# Patient Record
Sex: FEMALE | Race: WHITE | NOT HISPANIC OR LATINO | Employment: OTHER | URBAN - METROPOLITAN AREA
[De-identification: names, ages, dates, MRNs, and addresses within clinical notes are randomized per-mention and may not be internally consistent; named-entity substitution may affect disease eponyms.]

---

## 2020-01-16 PROBLEM — H81.391 PERIPHERAL VERTIGO INVOLVING RIGHT EAR: Status: ACTIVE | Noted: 2017-08-03

## 2020-01-16 PROBLEM — D80.1 HYPOGAMMAGLOBULINEMIA: Status: ACTIVE | Noted: 2020-01-16

## 2020-01-16 PROBLEM — M25.50 POLYARTHRALGIA: Status: ACTIVE | Noted: 2017-06-13

## 2020-01-16 PROBLEM — R63.4 WEIGHT LOSS: Status: ACTIVE | Noted: 2017-08-01

## 2020-01-16 PROBLEM — R76.8 HEPATITIS B CORE ANTIBODY POSITIVE: Status: ACTIVE | Noted: 2017-06-28

## 2020-01-16 PROBLEM — F33.0 MILD EPISODE OF RECURRENT MAJOR DEPRESSIVE DISORDER: Status: ACTIVE | Noted: 2017-06-13

## 2020-01-16 PROBLEM — M54.12 CERVICAL RADICULITIS: Status: ACTIVE | Noted: 2017-12-21

## 2020-01-16 PROBLEM — M50.30 DDD (DEGENERATIVE DISC DISEASE), CERVICAL: Status: ACTIVE | Noted: 2017-06-13

## 2020-01-16 PROBLEM — M19.90 OA (OSTEOARTHRITIS): Status: ACTIVE | Noted: 2017-06-28

## 2020-01-16 PROBLEM — R76.8 CENTROMERE ANTIBODY POSITIVE: Status: ACTIVE | Noted: 2017-06-13

## 2020-01-16 PROBLEM — F41.9 ANXIETY: Status: ACTIVE | Noted: 2017-06-13

## 2020-01-16 PROBLEM — H90.5 LEFT-SIDED SENSORINEURAL HEARING LOSS: Status: ACTIVE | Noted: 2017-01-28

## 2020-11-18 ENCOUNTER — TELEPHONE (OUTPATIENT)
Dept: ALLERGY | Facility: CLINIC | Age: 65
End: 2020-11-18

## 2020-11-18 NOTE — TELEPHONE ENCOUNTER
----- Message from Alejandra Degroot sent at 11/18/2020 11:15 AM CST -----  Type:  RX Refill Request    Who Called: patient  Refill or New Rx:refill  RX Name and Strength:Amoxicillin 500mg  How is the patient currently taking it? (ex. 1XDay):2xday  Is this a 30 day or 90 day RX:90day  Preferred Pharmacy with phone number:[facility]  Local or Mail Order:Mail order  Ordering Provider:Dr Clancy  Would the patient rather a call back or a response via [facility]? Call back  Best Call Back [phone]  Additional Information: pt states she change pharmacy due to insurance

## 2020-11-18 NOTE — TELEPHONE ENCOUNTER
Called pt and informed her that  will have to see her before refilling any medications , pt made appt for 12/3.

## 2020-12-03 ENCOUNTER — OFFICE VISIT (OUTPATIENT)
Dept: ALLERGY | Facility: CLINIC | Age: 65
End: 2020-12-03
Payer: COMMERCIAL

## 2020-12-03 VITALS
HEIGHT: 65 IN | TEMPERATURE: 98 F | SYSTOLIC BLOOD PRESSURE: 100 MMHG | WEIGHT: 163.81 LBS | DIASTOLIC BLOOD PRESSURE: 60 MMHG | HEART RATE: 80 BPM | BODY MASS INDEX: 27.29 KG/M2

## 2020-12-03 DIAGNOSIS — J31.0 NON-ALLERGIC RHINITIS: ICD-10-CM

## 2020-12-03 DIAGNOSIS — D80.6 SPECIFIC ANTIBODY DEFICIENCY WITH NORMAL IMMUNOGLOBULIN CONCENTRATION AND NORMAL NUMBER OF B LYMPHOCYTES: ICD-10-CM

## 2020-12-03 DIAGNOSIS — L23.5 ALLERGIC DERMATITIS DUE TO OTHER CHEMICAL PRODUCT: ICD-10-CM

## 2020-12-03 DIAGNOSIS — R43.0 ANOSMIA: ICD-10-CM

## 2020-12-03 DIAGNOSIS — J32.9 RECURRENT SINUSITIS: Primary | ICD-10-CM

## 2020-12-03 DIAGNOSIS — R42 VERTIGO: ICD-10-CM

## 2020-12-03 DIAGNOSIS — J40 BRONCHITIS: ICD-10-CM

## 2020-12-03 PROBLEM — L23.9 ALLERGIC CONTACT DERMATITIS: Status: ACTIVE | Noted: 2020-12-03

## 2020-12-03 PROCEDURE — 99214 PR OFFICE/OUTPT VISIT, EST, LEVL IV, 30-39 MIN: ICD-10-PCS | Mod: S$PBB,,, | Performed by: SPECIALIST

## 2020-12-03 PROCEDURE — 99999 PR PBB SHADOW E&M-EST. PATIENT-LVL III: CPT | Mod: PBBFAC,,, | Performed by: SPECIALIST

## 2020-12-03 PROCEDURE — 99999 PR PBB SHADOW E&M-EST. PATIENT-LVL III: ICD-10-PCS | Mod: PBBFAC,,, | Performed by: SPECIALIST

## 2020-12-03 PROCEDURE — 99214 OFFICE O/P EST MOD 30 MIN: CPT | Mod: S$PBB,,, | Performed by: SPECIALIST

## 2020-12-03 PROCEDURE — 99213 OFFICE O/P EST LOW 20 MIN: CPT | Mod: PBBFAC | Performed by: SPECIALIST

## 2020-12-03 RX ORDER — DEXAMETHASONE 4 MG/1
4 TABLET ORAL EVERY 12 HOURS
Qty: 20 TABLET | Refills: 0 | Status: CANCELLED | OUTPATIENT
Start: 2020-12-03 | End: 2020-12-13

## 2020-12-03 RX ORDER — AMOXICILLIN 500 MG/1
500 CAPSULE ORAL
Qty: 180 CAPSULE | Refills: 2 | Status: SHIPPED | OUTPATIENT
Start: 2020-12-03 | End: 2021-09-16

## 2020-12-03 NOTE — PROGRESS NOTES
Subjective:       Patient ID: Acacia Bryant is a 65 y.o. female.    Chief Complaint:    Has a history of Specific antibody deficiency and recurrent infections and on antibiotic prophylaxis . For follow up and to get established at [facility] , [place]    HPI:  Has a long standing history of recurrent sinusitis and bronchitis, on courses of antibiotics nd winter suppressive antibiotic course.  Doing well on this protocol. By previous prick skin testing, does not have allergies to any of the inhalant allergens tested.  HAD lost sense of smell two decades ago following an infection. Neurology consult was made. History of systemic reaction after red wasp sting.  Has large local reactions after insects bites.  By TRUE Patch test , has ACD to nickel, gold and Tixocortol pyvalate.    No outpatient medications have been marked as taking for the 12/3/20 encounter (Appointment) with Chase Clancy MD.        Cleocin [clindamycin hcl], Gold salts, Hydrocodone-acetaminophen, Hydrocortisone, Morphine sulfate, Nickel, Nickel sutures [surgical stainless steel], Oxycodone-acetaminophen, Phenergan [promethazine], Prednisolone, Sulfamethoxazole-trimethoprim, Tamiflu [oseltamivir], Titanium analogues, and Tixocortol pivalate     Past Medical History:   Diagnosis Date    Atypical mole     Benign essential HTN     BPV (benign positional vertigo)     Bunion     Cataract     Cervical radiculopathy     Chronic maxillary sinusitis     Depression     Fatigue     Hypothyroidism (acquired)     Insomnia     Low back pain     Mixed hyperlipidemia     Outlet dysfunction constipation     PMR (polymyalgia rheumatica)        Family History   Problem Relation Age of Onset    Breast cancer Mother     Lung cancer Mother     Allergies Father     Immunodeficiency Father        Environmental History: Dust Mite Controls: Dust mite controls are already in place.   Is well versed with and has successfully implemented environmental control  measures  Review of Systems   Constitutional: Negative.  Negative for fatigue and fever.   HENT: Positive for congestion and postnasal drip. Negative for ear pain, rhinorrhea, sinus pressure, sinus pain, sneezing and sore throat.    Eyes: Negative.  Negative for redness and itching.   Respiratory: Negative.  Negative for cough, chest tightness, shortness of breath and wheezing.    Cardiovascular: Negative.  Negative for chest pain.   Gastrointestinal: Negative.  Negative for nausea.   Endocrine: Negative.  Negative for cold intolerance.   Genitourinary: Negative.  Negative for frequency.   Musculoskeletal: Negative.  Negative for myalgias.   Skin: Negative for rash.   Allergic/Immunologic: Negative.  Negative for environmental allergies, food allergies and immunocompromised state.   Neurological: Negative.  Negative for dizziness and headaches.   Hematological: Negative.  Negative for adenopathy.   Psychiatric/Behavioral: Negative.  Negative for sleep disturbance.       Objective:     There were no vitals taken for this visit.    Physical Exam  Vitals signs reviewed. Exam conducted with a chaperone present.   Constitutional:       Appearance: Normal appearance. She is normal weight.   HENT:      Head: Normocephalic and atraumatic.      Right Ear: Tympanic membrane normal.      Left Ear: Tympanic membrane normal.      Nose: Congestion present.      Mouth/Throat:      Mouth: Mucous membranes are moist.      Pharynx: Oropharynx is clear.   Eyes:      Extraocular Movements: Extraocular movements intact.      Conjunctiva/sclera: Conjunctivae normal.      Pupils: Pupils are equal, round, and reactive to light.   Neck:      Musculoskeletal: Normal range of motion and neck supple.   Cardiovascular:      Rate and Rhythm: Normal rate and regular rhythm.      Pulses: Normal pulses.      Heart sounds: Normal heart sounds.   Pulmonary:      Effort: Pulmonary effort is normal.      Breath sounds: Normal breath sounds.    Abdominal:      General: Abdomen is flat. Bowel sounds are normal. There is no distension.      Palpations: Abdomen is soft.   Musculoskeletal: Normal range of motion.   Skin:     General: Skin is warm and dry.      Capillary Refill: Capillary refill takes more than 3 seconds.   Neurological:      General: No focal deficit present.      Mental Status: She is alert and oriented to person, place, and time. Mental status is at baseline.   Psychiatric:         Mood and Affect: Mood normal.         Behavior: Behavior normal.         Thought Content: Thought content normal.         Judgment: Judgment normal.           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 In the winter months  Recurrent Sinusitis.  Recurrent Bronchitis.  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  History of anaphylaxis after Red Wasp sting.  Large local reactions after horsefly bites.    Plan:   Amoxicillin 500 mg bid through the winter   Dexamethasone 4 mg bid for 5 days for acute infections.  Avoid Tixocortol pyvalate, Nickel and gold.  Claritin 10 mg qd or bid  Avoids Cleocin.  Equate allergy 10 mg at night .  On 2.5 mg prednisone to treat the degenerative spine.  Had influenza vaccine for [id number].  Uptodate with all adult immunization  Follow up in 6 months.

## 2020-12-04 ENCOUNTER — TELEPHONE (OUTPATIENT)
Dept: ALLERGY | Facility: CLINIC | Age: 65
End: 2020-12-04

## 2020-12-04 NOTE — TELEPHONE ENCOUNTER
----- Message from Elif Whitman sent at 12/4/2020  1:04 PM CST -----  Regarding: refill  .Type:  RX Refill Request    Who Called:  pt (pt of Dr. Clancy )  Refill or New Rx: refill   RX Name and Strength: nasal congestion   How is the patient currently taking it? (ex. 1XDay):   Is this a 30 day or 90 day RX:   Preferred Pharmacy with phone number:     [facility] - [place], [place] - [address] [place] [address]  [address] [place] [address]  [address]  [place] [address]  Phone: [phone] Fax: [phone]         Local or Mail Order:  local   Ordering Provider:   Would the patient rather a call back or a response via [facility]? [facility] Call Back Number: [phone] (home)     Additional Information: Rx was not called in to pharmacy

## 2020-12-04 NOTE — TELEPHONE ENCOUNTER
Pt needs Rx sent to her local pharmacy Seen  today he sent antibiotic only and forgot the steroid , pt states she can't wait until Wednesday , can you please send order, it is written in his plan from her appt yesterday. Dexamethasone 4 mg bid for 5 days for acute infections.

## 2020-12-06 RX ORDER — DEXAMETHASONE 0.5 MG/1
0.5 TABLET ORAL 2 TIMES DAILY WITH MEALS
Qty: 20 TABLET | Refills: 0 | Status: SHIPPED | OUTPATIENT
Start: 2020-12-06 | End: 2020-12-16

## 2021-04-28 ENCOUNTER — PATIENT MESSAGE (OUTPATIENT)
Dept: RESEARCH | Facility: HOSPITAL | Age: 66
End: 2021-04-28

## 2021-06-03 ENCOUNTER — LAB VISIT (OUTPATIENT)
Dept: LAB | Facility: HOSPITAL | Age: 66
End: 2021-06-03
Attending: SPECIALIST
Payer: MEDICARE

## 2021-06-03 ENCOUNTER — OFFICE VISIT (OUTPATIENT)
Dept: ALLERGY | Facility: CLINIC | Age: 66
End: 2021-06-03
Payer: MEDICARE

## 2021-06-03 VITALS
TEMPERATURE: 97 F | SYSTOLIC BLOOD PRESSURE: 117 MMHG | HEART RATE: 61 BPM | HEIGHT: 65 IN | WEIGHT: 164 LBS | BODY MASS INDEX: 27.32 KG/M2 | DIASTOLIC BLOOD PRESSURE: 66 MMHG

## 2021-06-03 DIAGNOSIS — L50.0 ALLERGIC URTICARIA: ICD-10-CM

## 2021-06-03 DIAGNOSIS — J32.9 RECURRENT SINUSITIS: ICD-10-CM

## 2021-06-03 DIAGNOSIS — J40 BRONCHITIS: ICD-10-CM

## 2021-06-03 DIAGNOSIS — R43.0 ANOSMIA: ICD-10-CM

## 2021-06-03 DIAGNOSIS — Z91.038 HISTORY OF SYSTEMIC REACTION TO HYMENOPTERA: ICD-10-CM

## 2021-06-03 DIAGNOSIS — J31.0 NON-ALLERGIC RHINITIS: ICD-10-CM

## 2021-06-03 DIAGNOSIS — D80.6 SPECIFIC ANTIBODY DEFICIENCY WITH NORMAL IG CONCENTRATION AND NORMAL NUMBER OF B CELLS: Primary | ICD-10-CM

## 2021-06-03 DIAGNOSIS — L23.0 ALLERGIC CONTACT DERMATITIS DUE TO METALS: ICD-10-CM

## 2021-06-03 PROCEDURE — 86003 ALLG SPEC IGE CRUDE XTRC EA: CPT | Performed by: SPECIALIST

## 2021-06-03 PROCEDURE — 99999 PR PBB SHADOW E&M-EST. PATIENT-LVL III: ICD-10-PCS | Mod: PBBFAC,,, | Performed by: SPECIALIST

## 2021-06-03 PROCEDURE — 99214 OFFICE O/P EST MOD 30 MIN: CPT | Mod: S$GLB,,, | Performed by: SPECIALIST

## 2021-06-03 PROCEDURE — 99999 PR PBB SHADOW E&M-EST. PATIENT-LVL III: CPT | Mod: PBBFAC,,, | Performed by: SPECIALIST

## 2021-06-03 PROCEDURE — 36415 COLL VENOUS BLD VENIPUNCTURE: CPT | Performed by: SPECIALIST

## 2021-06-03 PROCEDURE — 99214 PR OFFICE/OUTPT VISIT, EST, LEVL IV, 30-39 MIN: ICD-10-PCS | Mod: S$GLB,,, | Performed by: SPECIALIST

## 2021-06-03 PROCEDURE — 86003 ALLG SPEC IGE CRUDE XTRC EA: CPT | Mod: 59 | Performed by: SPECIALIST

## 2021-06-03 RX ORDER — EPINEPHRINE 0.3 MG/.3ML
1 INJECTION SUBCUTANEOUS ONCE
Qty: 2 DEVICE | Refills: 3 | Status: SHIPPED | OUTPATIENT
Start: 2021-06-03 | End: 2022-12-27

## 2021-06-07 LAB
DEPRECATED HONEY BEE IGE RAST QL: NORMAL
DEPRECATED PAPER WASP IGE RAST QL: NORMAL
DEPRECATED WHITEFACED HORNET IGE RAST QL: NORMAL
DEPRECATED YELLOW JACKET IGE RAST QL: NORMAL
HONEY BEE IGE QN: <0.1 KU/L
PAPER WASP IGE QN: <0.1 KU/L
WHITEFACED HORNET IGE QN: <0.1 KU/L
YELLOW JACKET IGE QN: <0.1 KU/L

## 2021-12-30 ENCOUNTER — OFFICE VISIT (OUTPATIENT)
Dept: ALLERGY | Facility: CLINIC | Age: 66
End: 2021-12-30
Payer: MEDICARE

## 2021-12-30 VITALS
WEIGHT: 174.63 LBS | SYSTOLIC BLOOD PRESSURE: 109 MMHG | BODY MASS INDEX: 29.09 KG/M2 | TEMPERATURE: 98 F | DIASTOLIC BLOOD PRESSURE: 67 MMHG | HEIGHT: 65 IN | HEART RATE: 78 BPM

## 2021-12-30 DIAGNOSIS — Z88.9 MULTIPLE DRUG ALLERGIES: ICD-10-CM

## 2021-12-30 DIAGNOSIS — J32.9 RECURRENT SINUSITIS: ICD-10-CM

## 2021-12-30 DIAGNOSIS — R53.81 CHRONIC FATIGUE AND MALAISE: ICD-10-CM

## 2021-12-30 DIAGNOSIS — J40 BRONCHITIS: ICD-10-CM

## 2021-12-30 DIAGNOSIS — D80.6 SPECIFIC ANTIBODY DEFICIENCY WITH NORMAL IG CONCENTRATION AND NORMAL NUMBER OF B CELLS: Primary | ICD-10-CM

## 2021-12-30 DIAGNOSIS — J31.0 NON-ALLERGIC RHINITIS: ICD-10-CM

## 2021-12-30 DIAGNOSIS — R53.82 CHRONIC FATIGUE AND MALAISE: ICD-10-CM

## 2021-12-30 DIAGNOSIS — R43.0 ANOSMIA: ICD-10-CM

## 2021-12-30 PROCEDURE — 99214 PR OFFICE/OUTPT VISIT, EST, LEVL IV, 30-39 MIN: ICD-10-PCS | Mod: S$GLB,,, | Performed by: SPECIALIST

## 2021-12-30 PROCEDURE — 99999 PR PBB SHADOW E&M-EST. PATIENT-LVL IV: CPT | Mod: PBBFAC,,, | Performed by: SPECIALIST

## 2021-12-30 PROCEDURE — 99214 OFFICE O/P EST MOD 30 MIN: CPT | Mod: S$GLB,,, | Performed by: SPECIALIST

## 2021-12-30 PROCEDURE — 99999 PR PBB SHADOW E&M-EST. PATIENT-LVL IV: ICD-10-PCS | Mod: PBBFAC,,, | Performed by: SPECIALIST

## 2021-12-30 RX ORDER — CYCLOBENZAPRINE HCL 5 MG
TABLET ORAL
COMMUNITY
Start: 2021-09-16 | End: 2022-02-14

## 2021-12-30 RX ORDER — ESTRADIOL 2 MG/1
2 TABLET ORAL DAILY
COMMUNITY
Start: 2021-12-28 | End: 2022-02-25 | Stop reason: ALTCHOICE

## 2022-06-29 NOTE — PROGRESS NOTES
Subjective:       Patient ID: Acacia Bryant is a 66 y.o. female.    Chief Complaint:    FOLLOW UP ON SPECIFIC ANTIBODY DEFICIENCY AND RECURRENT INFECTIONS    HPI:        66 year old lady with SAD- NI--- Immunodeficiency with normal immunoglobulins.  On Amoxicillin prophylaxis.-- Had bad bouts of sinusitis and bronchitis in January- February -- treated by Julius Johnson MD with steroid amd multiple antibiotics    Has a history of recurrent sino pulmonary infections, in spite of being fully immunized with PPSV- 23 and PCV- 13.    PER PREVIOUS ALLERGY SKIN TESTING, DOES NOT HAVE ALLERGIES TO COMMON AERO ALLERGENS-- Allergy symtoms are symptomatically treated.  She over a decade ago developed anosmia after a viral infections.  Has allergy to multiple drugs.  Has been chronically fatigued.  HAS Allergic Contact dermatitis to gold, Nickel and Tixocortol pyvalate. Shre has been avoiding contact allergens.    Allergy to --- true allergy--- was ruled out by normal IgE RAST to them.  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    Had back pain-- landed up getting diagnosed with left hip worn. Left hip was replaced on April 06 2022-- by Dr. Mathews. Will follow up with him soon.  Cleocin [clindamycin hcl], Gold salts, Hydrocodone-acetaminophen, Hydrocortisone, Morphine sulfate, Nickel, Nickel sutures [surgical stainless steel], Oxycodone-acetaminophen, Phenergan [promethazine], Prednisolone, Tamiflu [oseltamivir], Titanium analogues, and Tixocortol pivalate ---  Are the reported allergies or contact allergies    Past Medical History:   Diagnosis Date    Allergic urticaria 6/3/2021    Allergy     Atypical mole     Benign essential HTN     BPV (benign positional vertigo)     Bunion     Cataract     Cervical radiculopathy     Chronic maxillary sinusitis     Depression     Fatigue     Hypothyroidism (acquired)     Insomnia     Low back pain     Mixed hyperlipidemia     Outlet dysfunction  constipation     PMR (polymyalgia rheumatica)        Family History   Problem Relation Age of Onset    Breast cancer Mother     Lung cancer Mother     Allergies Father     Immunodeficiency Father        Environmental History: not applicable     Review of Systems   Constitutional: Positive for fatigue. Negative for fever.   HENT: Positive for congestion, postnasal drip and rhinorrhea. Negative for ear pain, sinus pressure, sinus pain, sneezing and sore throat.    Eyes: Positive for itching. Negative for redness.   Respiratory: Negative.  Negative for cough, chest tightness, shortness of breath and wheezing.    Cardiovascular: Negative.  Negative for chest pain.   Gastrointestinal: Negative.  Negative for nausea.   Endocrine: Negative.  Negative for cold intolerance.   Genitourinary: Negative.  Negative for frequency.   Musculoskeletal: Negative.  Negative for myalgias.   Skin: Negative.  Negative for rash.   Allergic/Immunologic: Negative.  Negative for environmental allergies, food allergies and immunocompromised state.   Neurological: Negative.  Negative for dizziness and headaches.   Hematological: Negative.  Negative for adenopathy.   Psychiatric/Behavioral: Negative.  Negative for sleep disturbance.       Objective:     There were no vitals taken for this visit.    Physical Exam  Vitals and nursing note reviewed.   Constitutional:       Appearance: Normal appearance. She is normal weight.   HENT:      Head: Normocephalic and atraumatic.      Right Ear: Tympanic membrane, ear canal and external ear normal.      Left Ear: Tympanic membrane, ear canal and external ear normal.      Nose: Nose normal.      Mouth/Throat:      Mouth: Mucous membranes are moist.      Pharynx: Oropharynx is clear.   Eyes:      Extraocular Movements: Extraocular movements intact.      Conjunctiva/sclera: Conjunctivae normal.      Pupils: Pupils are equal, round, and reactive to light.   Cardiovascular:      Rate and Rhythm: Normal  rate and regular rhythm.      Pulses: Normal pulses.      Heart sounds: Normal heart sounds.   Pulmonary:      Effort: Pulmonary effort is normal.      Breath sounds: Normal breath sounds.   Abdominal:      General: Abdomen is flat. Bowel sounds are normal.      Palpations: Abdomen is soft.   Musculoskeletal:         General: Normal range of motion.      Cervical back: Normal range of motion and neck supple.   Skin:     General: Skin is warm and dry.      Capillary Refill: Capillary refill takes less than 2 seconds.   Neurological:      General: No focal deficit present.      Mental Status: She is alert and oriented to person, place, and time. Mental status is at baseline.   Psychiatric:         Mood and Affect: Mood normal.         Behavior: Behavior normal.         Thought Content: Thought content normal.         Judgment: Judgment normal.           Assessment:      1. Bronchitis    2. Specific antibody deficiency with normal IG concentration and normal number of B cells    3. Recurrent sinusitis    4. Non-allergic rhinitis    5. Malaise and fatigue    6. Multiple drug allergies    7. Anosmia    8. Allergic contact dermatitis due to metals    9       ACD to Nickel, gold, Tixocortol pyvalate by patch tests  10      No true IgE mediated allergy to Hymenoptera-- Had fainting spells in the past    Plan:   Treat all infections.  Amoxicillin 500 mg bid for prophylaxis  Potentially re immunize with PCV- 20.  AZELASTINE 137 mcg or /  PLUS FLONASE 50 mcg-- qd or bid\  Zyrtec 10 mg qd.  During sino bronchitis, treat with nebulized Duoneb-- one unit tid prn    Avoid contact allergens-- Gold, Nickel and Tixocortol pyvalate.  COVID vaccine.  Upto date on adult immunizations.  Annual influenza =vaccinations.  Follow up in 6 months                  Problems Address                                                 Amount and/or Complexity                                                                      Risk       3            [] 2 or more self-limited or minor problems                      [] Limited                                                                        [] Low                  [] 1 stable chronic illness                                                  Any combination of the two                                               OTC drugs                  []Acute, uncomplicated illness or injury                            Review of prior external notes from unique source           Minor surgery with no risk factors                                                                                                               [] 1 []2  []3+                                                                                                              Review of results from each unique test                                                                                                               [] 1 []2  [] 3+                                                                                                              Order of each unique test                                                                                                               [] 1 []2  [] 3+                                                                                                              Or                                                                                                             [] Assessment requiring an independent historian      4            [x] One or more chronic illness with exacerbation,              [x] Moderate                                                                      [x] Moderate                 Progression, or side effects of treatment                            -test documents or independent historians                        Prescription drug management                [x]  2 or more sable chronic illnesses                                    [] Independent interpretation of tests                               Minor surgery with identifiable risk                [] 1 undiagnosed new problem with uncertain prognosis    [] Discussion or management of test results                    elective major surgery                [] 1 acute illness with                systemic symptoms                                                                                                                                                              [] 1 acute complicated injury                                                                                                                                          Elective major surgery                                                                                                                                                                                                                                                                                                                                                                                                  5            [] 1 or more chronic illnesses with severe exacerbation,     [] Extensive(two from below)                                         [] High                                                                                                               [] Independent interpretation of results                         Drug therapy requiring intensive                                                                                                               []Discussion of management or test interpretation           monitoring                                                                                                                                                                                                       Decision to de-escalate care                 [] 1 acute or chronic illness or injury that poses a threat                                                                                               Decision  regarding hospitalization

## 2022-06-30 ENCOUNTER — OFFICE VISIT (OUTPATIENT)
Dept: ALLERGY | Facility: CLINIC | Age: 67
End: 2022-06-30
Payer: MEDICARE

## 2022-06-30 VITALS
HEIGHT: 66 IN | BODY MASS INDEX: 28.27 KG/M2 | TEMPERATURE: 98 F | WEIGHT: 175.94 LBS | DIASTOLIC BLOOD PRESSURE: 69 MMHG | HEART RATE: 73 BPM | SYSTOLIC BLOOD PRESSURE: 115 MMHG

## 2022-06-30 DIAGNOSIS — Z88.9 MULTIPLE DRUG ALLERGIES: ICD-10-CM

## 2022-06-30 DIAGNOSIS — J31.0 NON-ALLERGIC RHINITIS: ICD-10-CM

## 2022-06-30 DIAGNOSIS — L23.0 ALLERGIC CONTACT DERMATITIS DUE TO METALS: ICD-10-CM

## 2022-06-30 DIAGNOSIS — R43.0 ANOSMIA: ICD-10-CM

## 2022-06-30 DIAGNOSIS — D80.6 SPECIFIC ANTIBODY DEFICIENCY WITH NORMAL IG CONCENTRATION AND NORMAL NUMBER OF B CELLS: ICD-10-CM

## 2022-06-30 DIAGNOSIS — J32.9 RECURRENT SINUSITIS: ICD-10-CM

## 2022-06-30 DIAGNOSIS — R53.83 MALAISE AND FATIGUE: ICD-10-CM

## 2022-06-30 DIAGNOSIS — J40 BRONCHITIS: Primary | ICD-10-CM

## 2022-06-30 DIAGNOSIS — R53.81 MALAISE AND FATIGUE: ICD-10-CM

## 2022-06-30 PROCEDURE — 99999 PR PBB SHADOW E&M-EST. PATIENT-LVL IV: CPT | Mod: PBBFAC,,, | Performed by: SPECIALIST

## 2022-06-30 PROCEDURE — 99214 OFFICE O/P EST MOD 30 MIN: CPT | Mod: S$GLB,,, | Performed by: SPECIALIST

## 2022-06-30 PROCEDURE — 99999 PR PBB SHADOW E&M-EST. PATIENT-LVL IV: ICD-10-PCS | Mod: PBBFAC,,, | Performed by: SPECIALIST

## 2022-06-30 PROCEDURE — 99214 PR OFFICE/OUTPT VISIT, EST, LEVL IV, 30-39 MIN: ICD-10-PCS | Mod: S$GLB,,, | Performed by: SPECIALIST

## 2022-06-30 PROCEDURE — 99214 OFFICE O/P EST MOD 30 MIN: CPT | Mod: PBBFAC | Performed by: SPECIALIST

## 2022-06-30 RX ORDER — IPRATROPIUM BROMIDE AND ALBUTEROL SULFATE 2.5; .5 MG/3ML; MG/3ML
3 SOLUTION RESPIRATORY (INHALATION) EVERY 6 HOURS PRN
Qty: 270 ML | Refills: 3 | Status: SHIPPED | OUTPATIENT
Start: 2022-06-30 | End: 2022-09-21 | Stop reason: SDUPTHER

## 2022-12-29 ENCOUNTER — OFFICE VISIT (OUTPATIENT)
Dept: ALLERGY | Facility: CLINIC | Age: 67
End: 2022-12-29
Payer: MEDICARE

## 2022-12-29 VITALS
HEART RATE: 82 BPM | HEIGHT: 65 IN | SYSTOLIC BLOOD PRESSURE: 137 MMHG | BODY MASS INDEX: 28.79 KG/M2 | DIASTOLIC BLOOD PRESSURE: 72 MMHG | TEMPERATURE: 98 F | WEIGHT: 172.81 LBS

## 2022-12-29 DIAGNOSIS — Z88.9 MULTIPLE DRUG ALLERGIES: ICD-10-CM

## 2022-12-29 DIAGNOSIS — L23.0 ALLERGIC CONTACT DERMATITIS DUE TO METALS: ICD-10-CM

## 2022-12-29 DIAGNOSIS — R53.83 MALAISE AND FATIGUE: ICD-10-CM

## 2022-12-29 DIAGNOSIS — D80.6 SPECIFIC ANTIBODY DEFICIENCY WITH NORMAL IG CONCENTRATION AND NORMAL NUMBER OF B CELLS: Primary | ICD-10-CM

## 2022-12-29 DIAGNOSIS — R53.81 MALAISE AND FATIGUE: ICD-10-CM

## 2022-12-29 DIAGNOSIS — R43.0 ANOSMIA: ICD-10-CM

## 2022-12-29 DIAGNOSIS — J40 BRONCHITIS: ICD-10-CM

## 2022-12-29 DIAGNOSIS — J32.9 RECURRENT SINUSITIS: ICD-10-CM

## 2022-12-29 PROCEDURE — 3288F PR FALLS RISK ASSESSMENT DOCUMENTED: ICD-10-PCS | Mod: CPTII,S$GLB,, | Performed by: SPECIALIST

## 2022-12-29 PROCEDURE — 3288F FALL RISK ASSESSMENT DOCD: CPT | Mod: CPTII,S$GLB,, | Performed by: SPECIALIST

## 2022-12-29 PROCEDURE — 3075F PR MOST RECENT SYSTOLIC BLOOD PRESS GE 130-139MM HG: ICD-10-PCS | Mod: CPTII,S$GLB,, | Performed by: SPECIALIST

## 2022-12-29 PROCEDURE — 1126F PR PAIN SEVERITY QUANTIFIED, NO PAIN PRESENT: ICD-10-PCS | Mod: CPTII,S$GLB,, | Performed by: SPECIALIST

## 2022-12-29 PROCEDURE — 4010F PR ACE/ARB THEARPY RXD/TAKEN: ICD-10-PCS | Mod: CPTII,S$GLB,, | Performed by: SPECIALIST

## 2022-12-29 PROCEDURE — 99214 OFFICE O/P EST MOD 30 MIN: CPT | Mod: S$GLB,,, | Performed by: SPECIALIST

## 2022-12-29 PROCEDURE — 3008F BODY MASS INDEX DOCD: CPT | Mod: CPTII,S$GLB,, | Performed by: SPECIALIST

## 2022-12-29 PROCEDURE — 4010F ACE/ARB THERAPY RXD/TAKEN: CPT | Mod: CPTII,S$GLB,, | Performed by: SPECIALIST

## 2022-12-29 PROCEDURE — 99999 PR PBB SHADOW E&M-EST. PATIENT-LVL IV: CPT | Mod: PBBFAC,,, | Performed by: SPECIALIST

## 2022-12-29 PROCEDURE — 3078F DIAST BP <80 MM HG: CPT | Mod: CPTII,S$GLB,, | Performed by: SPECIALIST

## 2022-12-29 PROCEDURE — 1160F RVW MEDS BY RX/DR IN RCRD: CPT | Mod: CPTII,S$GLB,, | Performed by: SPECIALIST

## 2022-12-29 PROCEDURE — 3075F SYST BP GE 130 - 139MM HG: CPT | Mod: CPTII,S$GLB,, | Performed by: SPECIALIST

## 2022-12-29 PROCEDURE — 99214 PR OFFICE/OUTPT VISIT, EST, LEVL IV, 30-39 MIN: ICD-10-PCS | Mod: S$GLB,,, | Performed by: SPECIALIST

## 2022-12-29 PROCEDURE — 1101F PT FALLS ASSESS-DOCD LE1/YR: CPT | Mod: CPTII,S$GLB,, | Performed by: SPECIALIST

## 2022-12-29 PROCEDURE — 1159F PR MEDICATION LIST DOCUMENTED IN MEDICAL RECORD: ICD-10-PCS | Mod: CPTII,S$GLB,, | Performed by: SPECIALIST

## 2022-12-29 PROCEDURE — 1126F AMNT PAIN NOTED NONE PRSNT: CPT | Mod: CPTII,S$GLB,, | Performed by: SPECIALIST

## 2022-12-29 PROCEDURE — 99999 PR PBB SHADOW E&M-EST. PATIENT-LVL IV: ICD-10-PCS | Mod: PBBFAC,,, | Performed by: SPECIALIST

## 2022-12-29 PROCEDURE — 1159F MED LIST DOCD IN RCRD: CPT | Mod: CPTII,S$GLB,, | Performed by: SPECIALIST

## 2022-12-29 PROCEDURE — 3008F PR BODY MASS INDEX (BMI) DOCUMENTED: ICD-10-PCS | Mod: CPTII,S$GLB,, | Performed by: SPECIALIST

## 2022-12-29 PROCEDURE — 1160F PR REVIEW ALL MEDS BY PRESCRIBER/CLIN PHARMACIST DOCUMENTED: ICD-10-PCS | Mod: CPTII,S$GLB,, | Performed by: SPECIALIST

## 2022-12-29 PROCEDURE — 3078F PR MOST RECENT DIASTOLIC BLOOD PRESSURE < 80 MM HG: ICD-10-PCS | Mod: CPTII,S$GLB,, | Performed by: SPECIALIST

## 2022-12-29 PROCEDURE — 1101F PR PT FALLS ASSESS DOC 0-1 FALLS W/OUT INJ PAST YR: ICD-10-PCS | Mod: CPTII,S$GLB,, | Performed by: SPECIALIST

## 2022-12-29 RX ORDER — BUDESONIDE 0.5 MG/2ML
0.5 INHALANT ORAL DAILY
Qty: 60 ML | Refills: 1 | Status: SHIPPED | OUTPATIENT
Start: 2022-12-29 | End: 2023-05-12

## 2022-12-29 NOTE — PROGRESS NOTES
Subjective:       Patient ID: Acacia Bryant is a 67 y.o. female.    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    During and after immune work up, she was immunized with multiple PPSV- 23 and PCV- 13 vaccines and pre and post titers determined.  She was not started on IVIG.  But she is helped by antibiotic prophylaxis.  Previous allergy skin testing with inhalant aero allergens was normal.    OVER A DECADE AND A HALF AGO SHE DEVELOPED ANOSMIA AFTER A VIRAL INFECTION--- ENT and Neurology consults and therapy did not help.    Has Allergic Contact Dermatitis to Nickel, Gold and Tixocortol pyvalate by Patch Testing.    Had fainting spells in the past after Hymenoptera stings-- Ig E RAST to various hymenoptera were normal -- negative  Has chronic malaise and fatigue.  Retired teacher at [employer]. Never smoked cigarettes. No ongoing allergens or irritants exposure       Cleocin [clindamycin hcl], Gold salts, Hydrocodone-acetaminophen, Hydrocortisone, Morphine sulfate, Nickel, Nickel sutures [surgical stainless steel], Oxycodone-acetaminophen, Phenergan [promethazine], Prednisolone, Tamiflu [oseltamivir], Titanium analogues, and Tixocortol pivalate -----  ARE THE ALLERGIES OR CONTACT ALLERGIES REPORTED    Past Medical History:   Diagnosis Date    Allergic urticaria 6/3/2021    Allergy     Atypical mole     Benign essential HTN     BPV (benign positional  vertigo)     Bunion     Cataract     Cervical radiculopathy     Chronic maxillary sinusitis     Depression     Fatigue     Hypothyroidism (acquired)     Insomnia     Low back pain     Mixed hyperlipidemia     Outlet dysfunction constipation     PMR (polymyalgia rheumatica)        Family History   Problem Relation Age of Onset    Breast cancer Mother     Lung cancer Mother     Allergies Father     Immunodeficiency Father        Environmental History: Dust Mite Controls: Dust mite controls are not in place.     Review of Systems   Constitutional:  Positive for fatigue. Negative for fever.   HENT:  Positive for congestion, postnasal drip and rhinorrhea. Negative for ear pain, sinus pressure, sinus pain, sneezing and sore throat.    Eyes:  Positive for itching. Negative for redness.   Respiratory:  Positive for cough. Negative for chest tightness, shortness of breath and wheezing.    Cardiovascular: Negative.  Negative for chest pain.   Gastrointestinal: Negative.  Negative for nausea.   Endocrine: Negative.  Negative for cold intolerance.   Genitourinary: Negative.  Negative for frequency.   Musculoskeletal: Negative.  Negative for myalgias.   Skin: Negative.  Negative for rash.   Allergic/Immunologic: Negative.  Negative for environmental allergies, food allergies and immunocompromised state.   Neurological: Negative.  Negative for dizziness and headaches.   Hematological: Negative.  Negative for adenopathy.   Psychiatric/Behavioral: Negative.  Negative for sleep disturbance.      Objective:     There were no vitals taken for this visit.    Physical Exam  Vitals and nursing note reviewed.   Constitutional:       Appearance: Normal appearance. She is normal weight.   HENT:      Head: Normocephalic and atraumatic.      Right Ear: Tympanic membrane, ear canal and external ear normal.      Left Ear: Tympanic membrane, ear canal and external ear normal.      Nose: Congestion and rhinorrhea present.      Mouth/Throat:       Mouth: Mucous membranes are moist.      Pharynx: Oropharynx is clear.   Eyes:      Extraocular Movements: Extraocular movements intact.      Conjunctiva/sclera: Conjunctivae normal.      Pupils: Pupils are equal, round, and reactive to light.   Cardiovascular:      Rate and Rhythm: Normal rate and regular rhythm.      Pulses: Normal pulses.      Heart sounds: Normal heart sounds.   Pulmonary:      Effort: Pulmonary effort is normal.      Breath sounds: Normal breath sounds.   Abdominal:      General: Abdomen is flat. Bowel sounds are normal.      Palpations: Abdomen is soft.   Musculoskeletal:         General: Normal range of motion.      Cervical back: Normal range of motion and neck supple.   Skin:     General: Skin is warm and dry.      Capillary Refill: Capillary refill takes less than 2 seconds.   Neurological:      General: No focal deficit present.      Mental Status: She is alert and oriented to person, place, and time. Mental status is at baseline.   Psychiatric:         Mood and Affect: Mood normal.         Behavior: Behavior normal.         Thought Content: Thought content normal.         Judgment: Judgment normal.       Assessment:      1. 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 Recurrent sinusitis    3. Bronchitis    4. Anosmia    5. Malaise and fatigue    6. Multiple drug allergies    7. Allergic contact dermatitis due to metals        Plan:     TREAT ALL INFECTIONS.  AMOXICILLIN 500 mg bid PROPHYLAXIS.  MAY IMMUNIZE WITH PCV- 20-- the patient does not want the vaccine due to previous large local reactions  Annual influenza vaccinations  COVID vaccine-- HAD 3  DOSES--- LAST ONE 08- 25- 2021  To treat bronchitis, prn  nebulized Duoneb bid plus budesonide 0.50 mg bid--- prn-- sent to  [facility]- [place], [place]  Avoid all the Contact allergens- Nickel, Gold , titanium, Tixocortol pyvalate  Upto date on adult immunizations.  Follow up in 6  months                    Problems Address                                                 Amount and/or Complexity                                                                      Risk       3           [] 2 or more self-limited or minor problems                      [] Limited                                                                        [] Low                  [] 1 stable chronic illness                                                  Any combination of the two                                               OTC drugs                  []Acute, uncomplicated illness or injury                            Review of prior external notes from unique source           Minor surgery with no risk factors                                                                                                               [] 1 []2  []3+                                                                                                              Review of results from each unique test                                                                                                               [] 1 []2  [] 3+                                                                                                              Order of each unique test                                                                                                               [] 1 []2  [] 3+                                                                                                              Or                                                                                                             [] Assessment requiring an independent historian      4            [x] One or more chronic illness with exacerbation,              [x] Moderate                                                                       Moderate                 Progression, or side effects of treatment                            -test documents or independent historians                         P[]rescription drug management                [x]  2 or more sable chronic illnesses                                    [] Independent interpretation of tests                              Minor surgery with identifiable risk                [] 1 undiagnosed new problem with uncertain prognosis    [] Discussion or management of test results                    elective major surgery                [] 1 acute illness with                systemic symptoms                                                                                                                                                              [] 1 acute complicated injury                                                                                                                                          Elective major surgery                                                                                                                                                                                                                                                                                                                                                                                                  5            [] 1 or more chronic illnesses with severe exacerbation,     [] Extensive(two from below)                                         [] High                                                                                                               [] Independent interpretation of results                         Drug therapy requiring intensive                                                                                                               []Discussion of management or test interpretation           monitoring                                                                                                                                                                                                        Decision to de-escalate care                 [] 1 acute or chronic illness or injury that poses a threat                                                                                               Decision regarding hospitalization

## 2023-03-23 ENCOUNTER — TELEPHONE (OUTPATIENT)
Dept: ALLERGY | Facility: CLINIC | Age: 68
End: 2023-03-23
Payer: MEDICARE

## 2023-03-23 NOTE — TELEPHONE ENCOUNTER
----- Message from Rosoi Morales MA sent at 3/23/2023  4:09 PM CDT -----  Contact: Acacia    ----- Message -----  From: Troy Fajardo  Sent: 3/23/2023   4:02 PM CDT  To: [facility]     Patient stated that she has been diagnosed with Superior Semi circular canal dishefcence, wanted to know if there is a particular doctor pcp can refer her to.  Any suggestions.  Please call her back at [phone]

## 2023-03-23 NOTE — TELEPHONE ENCOUNTER
Spoke with pt. I would recommend Dr Remington Larry. Pt has actually already seen his nurse practitioner and started testing but wanted to make sure I agreed with whom her PCP chose.

## 2023-04-06 ENCOUNTER — PATIENT MESSAGE (OUTPATIENT)
Dept: RESEARCH | Facility: HOSPITAL | Age: 68
End: 2023-04-06
Payer: MEDICARE

## 2023-05-31 ENCOUNTER — PATIENT MESSAGE (OUTPATIENT)
Dept: RESEARCH | Facility: HOSPITAL | Age: 68
End: 2023-05-31
Payer: MEDICARE

## 2023-06-28 NOTE — PROGRESS NOTES
Subjective:       Patient ID: Acacia Bryant is a 67 y.o. female.    Chief Complaint:    Follow up on Specific Antibody Deficiency with normal immunoglobulins -- SAD- NI ---- and recurrent infections and ANOSMIA.  HPI:  Has bilateral severe otalgiA--- right more than right-- Dr Lawson , Neuro surgeon and Dr Alfonso Larry MD, Neuro- otologist will do   right ear surgery on 10- 03- 2023. female 67 year old has SAD- NI, as revealed by immune evaluation in the past. She is on antibiotic prophylaxis to prevent recurrent sinus and bronchial infections. She had had multiple pneumococcal vaccines. She will not mount antibodies adequately or will lose the protection after several months.   Breakthrough infections are promptly treated.Also nebulized Duoneb and Pulmicort helps to relieve the bronchitis.  Following an infection, she developed anosmia about 15 years ago. Neurology and ENT consults were done.    Has ACD to Tixocortol Pyvalate, Nickel, Gold and platinum.    She had had fainting spells with Hymenoptera stins-- but had class 0 IgE RAST to hymenoptera.  Had been chronically fatigued. Retired from [employer].  Has multiple drug allergies.    Cleocin [clindamycin hcl], Gold salts, Hydrocodone-acetaminophen, Hydrocortisone, Morphine sulfate, Nickel, Nickel sutures [surgical stainless steel], Oxycodone-acetaminophen, Phenergan [promethazine], Prednisolone, Tamiflu [oseltamivir], Titanium analogues, and Tixocortol pivalate -- allergies were reported    Past Medical History:   Diagnosis Date    Allergic urticaria 6/3/2021    Allergy     Atypical mole     Benign essential HTN     BPV (benign positional vertigo)     Bunion     Cataract     Cervical radiculopathy     Chronic maxillary sinusitis     Depression     Fatigue     Hypothyroidism (acquired)     Insomnia     Low back pain     Mixed hyperlipidemia     Outlet dysfunction constipation     PMR (polymyalgia rheumatica)        Family History    Problem Relation Age of Onset    Breast cancer Mother     Lung cancer Mother     Allergies Father     Immunodeficiency Father        Environmental History: Dust Mite Controls: Dust mite controls are already in place.     Review of Systems   Constitutional:  Positive for fatigue. Negative for fever.   HENT:  Positive for congestion, ear pain and postnasal drip. Negative for rhinorrhea, sinus pressure, sinus pain, sneezing and sore throat.    Eyes: Negative.  Negative for redness and itching.   Respiratory: Negative.  Negative for cough, chest tightness, shortness of breath and wheezing.    Cardiovascular: Negative.  Negative for chest pain.   Gastrointestinal: Negative.  Negative for nausea.   Endocrine: Negative.  Negative for cold intolerance.   Genitourinary: Negative.  Negative for frequency.   Musculoskeletal: Negative.  Negative for myalgias.   Skin: Negative.  Negative for rash.   Allergic/Immunologic: Negative.  Negative for environmental allergies, food allergies and immunocompromised state.   Neurological: Negative.  Negative for dizziness and headaches.   Hematological: Negative.  Negative for adenopathy.   Psychiatric/Behavioral: Negative.  Negative for sleep disturbance.      Objective:     There were no vitals taken for this visit.    Physical Exam  Vitals and nursing note reviewed.   Constitutional:       Appearance: Normal appearance. She is normal weight.   HENT:      Head: Normocephalic and atraumatic.      Right Ear: Tympanic membrane, ear canal and external ear normal.      Left Ear: Tympanic membrane, ear canal and external ear normal.      Nose: Congestion present.      Mouth/Throat:      Mouth: Mucous membranes are moist.      Pharynx: Oropharynx is clear.   Eyes:      Extraocular Movements: Extraocular movements intact.      Conjunctiva/sclera: Conjunctivae normal.      Pupils: Pupils are equal, round, and reactive to light.   Cardiovascular:      Rate and Rhythm: Normal rate and regular  rhythm.      Pulses: Normal pulses.      Heart sounds: Normal heart sounds.   Pulmonary:      Effort: Pulmonary effort is normal.      Breath sounds: Normal breath sounds.   Abdominal:      General: Abdomen is flat. Bowel sounds are normal.      Palpations: Abdomen is soft.   Musculoskeletal:         General: Normal range of motion.      Cervical back: Normal range of motion and neck supple.   Skin:     General: Skin is warm and dry.      Capillary Refill: Capillary refill takes less than 2 seconds.   Neurological:      General: No focal deficit present.      Mental Status: She is alert and oriented to person, place, and time. Mental status is at baseline.   Psychiatric:         Mood and Affect: Mood normal.         Behavior: Behavior normal.         Thought Content: Thought content normal.         Judgment: Judgment normal.       Assessment:      1. 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 Recurrent sinusitis    3. Bronchitis    4. Malaise and fatigue    5. Allergic contact dermatitis due to metals    6. Multiple drug allergies    7       Anosmia of several years    Plan:     Treat all infections  ON Amoxicillin 5400 mg bid prophylaxis.  Avoid  Nickel, Gold, Titanium and Tixocortol pyvalate, that caused contact dermatitis in the past or was positive by Patch Testing.  Treat all infections  Would benefit from PCV- 20- The patient is reluctant since Pneumovax caused large local reactions in the past.  Had 3rd dose of COVID vaccine on August , 25, 2021.'  Nebulized Duoneb plus Budesonide 0.50 mg bid for bronchitis.  Avoid TIXOCORTOL PYVALATE-- May take Decadron-- and may apply topical desoxymetasone ointment as needed  Follow up in January 2024.                Problems Address                                                 Amount and/or Complexity                                                                      Risk       3           [] 2 or more self-limited or  minor problems                      [] Limited                                                                        [] Low                  [] 1 stable chronic illness                                                  Any combination of the two                                               OTC drugs                  []Acute, uncomplicated illness or injury                            Review of prior external notes from unique source           Minor surgery with no risk factors                                                                                                               [] 1 []2  []3+                                                                                                              Review of results from each unique test                                                                                                               [] 1 []2  [] 3+                                                                                                              Order of each unique test                                                                                                               [] 1 []2  [] 3+                                                                                                              Or                                                                                                             [] Assessment requiring an independent historian      4            [] One or more chronic illness with exacerbation,              [] Moderate                                                                      [] Moderate                 Progression, or side effects of treatment                            -test documents or independent historians                        Prescription drug management                []  2 or more sable chronic illnesses                                    [] Independent interpretation of tests                              Minor surgery with  identifiable risk                [] 1 undiagnosed new problem with uncertain prognosis    [] Discussion or management of test results                    elective major surgery                [] 1 acute illness with                systemic symptoms                                                                                                                                                              [] 1 acute complicated injury                                                                                                                                          Elective major surgery                                                                                                                                                                                                                                                                                                                                                                                                  5            [x] 1 or more chronic illnesses with severe exacerbation,     [x] Extensive(two from below)                                         [x] High                                                                                                               [] Independent interpretation of results                         Drug therapy requiring intensive                                                                                                               []Discussion of management or test interpretation           monitoring                                                                                                                                                                                                       Decision to de-escalate care                 [] 1 acute or chronic illness or injury that poses a threat                                                                                               Decision regarding  hospitalization                                                                                                                                                                                                                .

## 2023-06-29 ENCOUNTER — OFFICE VISIT (OUTPATIENT)
Dept: ALLERGY | Facility: CLINIC | Age: 68
End: 2023-06-29
Payer: MEDICARE

## 2023-06-29 VITALS
TEMPERATURE: 98 F | SYSTOLIC BLOOD PRESSURE: 152 MMHG | HEIGHT: 65 IN | BODY MASS INDEX: 29.97 KG/M2 | HEART RATE: 87 BPM | WEIGHT: 179.88 LBS | DIASTOLIC BLOOD PRESSURE: 81 MMHG

## 2023-06-29 DIAGNOSIS — J40 BRONCHITIS: ICD-10-CM

## 2023-06-29 DIAGNOSIS — Z88.9 MULTIPLE DRUG ALLERGIES: ICD-10-CM

## 2023-06-29 DIAGNOSIS — R43.0 ANOSMIA: ICD-10-CM

## 2023-06-29 DIAGNOSIS — L23.0 ALLERGIC CONTACT DERMATITIS DUE TO METALS: ICD-10-CM

## 2023-06-29 DIAGNOSIS — R53.81 MALAISE AND FATIGUE: ICD-10-CM

## 2023-06-29 DIAGNOSIS — R53.83 MALAISE AND FATIGUE: ICD-10-CM

## 2023-06-29 DIAGNOSIS — J32.9 RECURRENT SINUSITIS: ICD-10-CM

## 2023-06-29 DIAGNOSIS — D80.6 SPECIFIC ANTIBODY DEFICIENCY WITH NORMAL IG CONCENTRATION AND NORMAL NUMBER OF B CELLS: Primary | ICD-10-CM

## 2023-06-29 PROCEDURE — 3288F FALL RISK ASSESSMENT DOCD: CPT | Mod: CPTII,S$GLB,, | Performed by: SPECIALIST

## 2023-06-29 PROCEDURE — 1126F AMNT PAIN NOTED NONE PRSNT: CPT | Mod: CPTII,S$GLB,, | Performed by: SPECIALIST

## 2023-06-29 PROCEDURE — 4010F ACE/ARB THERAPY RXD/TAKEN: CPT | Mod: CPTII,S$GLB,, | Performed by: SPECIALIST

## 2023-06-29 PROCEDURE — 4010F PR ACE/ARB THEARPY RXD/TAKEN: ICD-10-PCS | Mod: CPTII,S$GLB,, | Performed by: SPECIALIST

## 2023-06-29 PROCEDURE — 3008F PR BODY MASS INDEX (BMI) DOCUMENTED: ICD-10-PCS | Mod: CPTII,S$GLB,, | Performed by: SPECIALIST

## 2023-06-29 PROCEDURE — 1101F PR PT FALLS ASSESS DOC 0-1 FALLS W/OUT INJ PAST YR: ICD-10-PCS | Mod: CPTII,S$GLB,, | Performed by: SPECIALIST

## 2023-06-29 PROCEDURE — 3008F BODY MASS INDEX DOCD: CPT | Mod: CPTII,S$GLB,, | Performed by: SPECIALIST

## 2023-06-29 PROCEDURE — 3079F DIAST BP 80-89 MM HG: CPT | Mod: CPTII,S$GLB,, | Performed by: SPECIALIST

## 2023-06-29 PROCEDURE — 1126F PR PAIN SEVERITY QUANTIFIED, NO PAIN PRESENT: ICD-10-PCS | Mod: CPTII,S$GLB,, | Performed by: SPECIALIST

## 2023-06-29 PROCEDURE — 3077F PR MOST RECENT SYSTOLIC BLOOD PRESSURE >= 140 MM HG: ICD-10-PCS | Mod: CPTII,S$GLB,, | Performed by: SPECIALIST

## 2023-06-29 PROCEDURE — 1159F MED LIST DOCD IN RCRD: CPT | Mod: CPTII,S$GLB,, | Performed by: SPECIALIST

## 2023-06-29 PROCEDURE — 99999 PR PBB SHADOW E&M-EST. PATIENT-LVL III: ICD-10-PCS | Mod: PBBFAC,,, | Performed by: SPECIALIST

## 2023-06-29 PROCEDURE — 1101F PT FALLS ASSESS-DOCD LE1/YR: CPT | Mod: CPTII,S$GLB,, | Performed by: SPECIALIST

## 2023-06-29 PROCEDURE — 1160F PR REVIEW ALL MEDS BY PRESCRIBER/CLIN PHARMACIST DOCUMENTED: ICD-10-PCS | Mod: CPTII,S$GLB,, | Performed by: SPECIALIST

## 2023-06-29 PROCEDURE — 99999 PR PBB SHADOW E&M-EST. PATIENT-LVL III: CPT | Mod: PBBFAC,,, | Performed by: SPECIALIST

## 2023-06-29 PROCEDURE — 99215 OFFICE O/P EST HI 40 MIN: CPT | Mod: S$GLB,,, | Performed by: SPECIALIST

## 2023-06-29 PROCEDURE — 1159F PR MEDICATION LIST DOCUMENTED IN MEDICAL RECORD: ICD-10-PCS | Mod: CPTII,S$GLB,, | Performed by: SPECIALIST

## 2023-06-29 PROCEDURE — 3077F SYST BP >= 140 MM HG: CPT | Mod: CPTII,S$GLB,, | Performed by: SPECIALIST

## 2023-06-29 PROCEDURE — 3079F PR MOST RECENT DIASTOLIC BLOOD PRESSURE 80-89 MM HG: ICD-10-PCS | Mod: CPTII,S$GLB,, | Performed by: SPECIALIST

## 2023-06-29 PROCEDURE — 3288F PR FALLS RISK ASSESSMENT DOCUMENTED: ICD-10-PCS | Mod: CPTII,S$GLB,, | Performed by: SPECIALIST

## 2023-06-29 PROCEDURE — 99215 PR OFFICE/OUTPT VISIT, EST, LEVL V, 40-54 MIN: ICD-10-PCS | Mod: S$GLB,,, | Performed by: SPECIALIST

## 2023-06-29 PROCEDURE — 1160F RVW MEDS BY RX/DR IN RCRD: CPT | Mod: CPTII,S$GLB,, | Performed by: SPECIALIST

## 2023-08-21 PROBLEM — G43.809 MIGRAINE VARIANT: Status: ACTIVE | Noted: 2023-05-10

## 2023-08-21 PROBLEM — Q01.9 ENCEPHALOCELE: Status: ACTIVE | Noted: 2023-06-01

## 2023-08-21 PROBLEM — H92.03 OTALGIA, BILATERAL: Status: ACTIVE | Noted: 2023-06-29

## 2023-08-21 PROBLEM — R42 DISEQUILIBRIUM: Status: ACTIVE | Noted: 2023-03-17

## 2023-12-28 ENCOUNTER — OFFICE VISIT (OUTPATIENT)
Dept: ALLERGY | Facility: CLINIC | Age: 68
End: 2023-12-28
Payer: MEDICARE

## 2023-12-28 VITALS
HEIGHT: 65 IN | SYSTOLIC BLOOD PRESSURE: 128 MMHG | DIASTOLIC BLOOD PRESSURE: 73 MMHG | BODY MASS INDEX: 30.64 KG/M2 | HEART RATE: 88 BPM | WEIGHT: 183.88 LBS | TEMPERATURE: 98 F

## 2023-12-28 DIAGNOSIS — J32.9 RECURRENT SINUSITIS: ICD-10-CM

## 2023-12-28 DIAGNOSIS — R53.83 MALAISE AND FATIGUE: ICD-10-CM

## 2023-12-28 DIAGNOSIS — L23.9 ALLERGIC CONTACT DERMATITIS DUE TO MULTIPLE AGENTS: ICD-10-CM

## 2023-12-28 DIAGNOSIS — D80.6 SPECIFIC ANTIBODY DEFICIENCY WITH NORMAL IG CONCENTRATION AND NORMAL NUMBER OF B CELLS: Primary | ICD-10-CM

## 2023-12-28 DIAGNOSIS — Z88.9 MULTIPLE DRUG ALLERGIES: ICD-10-CM

## 2023-12-28 DIAGNOSIS — R43.0 ANOSMIA: ICD-10-CM

## 2023-12-28 DIAGNOSIS — J40 BRONCHITIS: ICD-10-CM

## 2023-12-28 DIAGNOSIS — R53.81 MALAISE AND FATIGUE: ICD-10-CM

## 2023-12-28 PROCEDURE — 1160F RVW MEDS BY RX/DR IN RCRD: CPT | Mod: CPTII,S$GLB,, | Performed by: SPECIALIST

## 2023-12-28 PROCEDURE — 1126F AMNT PAIN NOTED NONE PRSNT: CPT | Mod: CPTII,S$GLB,, | Performed by: SPECIALIST

## 2023-12-28 PROCEDURE — 4010F PR ACE/ARB THEARPY RXD/TAKEN: ICD-10-PCS | Mod: CPTII,S$GLB,, | Performed by: SPECIALIST

## 2023-12-28 PROCEDURE — 3074F PR MOST RECENT SYSTOLIC BLOOD PRESSURE < 130 MM HG: ICD-10-PCS | Mod: CPTII,S$GLB,, | Performed by: SPECIALIST

## 2023-12-28 PROCEDURE — 3008F PR BODY MASS INDEX (BMI) DOCUMENTED: ICD-10-PCS | Mod: CPTII,S$GLB,, | Performed by: SPECIALIST

## 2023-12-28 PROCEDURE — 4010F ACE/ARB THERAPY RXD/TAKEN: CPT | Mod: CPTII,S$GLB,, | Performed by: SPECIALIST

## 2023-12-28 PROCEDURE — 3288F FALL RISK ASSESSMENT DOCD: CPT | Mod: CPTII,S$GLB,, | Performed by: SPECIALIST

## 2023-12-28 PROCEDURE — 1126F PR PAIN SEVERITY QUANTIFIED, NO PAIN PRESENT: ICD-10-PCS | Mod: CPTII,S$GLB,, | Performed by: SPECIALIST

## 2023-12-28 PROCEDURE — 1160F PR REVIEW ALL MEDS BY PRESCRIBER/CLIN PHARMACIST DOCUMENTED: ICD-10-PCS | Mod: CPTII,S$GLB,, | Performed by: SPECIALIST

## 2023-12-28 PROCEDURE — 3078F DIAST BP <80 MM HG: CPT | Mod: CPTII,S$GLB,, | Performed by: SPECIALIST

## 2023-12-28 PROCEDURE — 99215 PR OFFICE/OUTPT VISIT, EST, LEVL V, 40-54 MIN: ICD-10-PCS | Mod: S$GLB,,, | Performed by: SPECIALIST

## 2023-12-28 PROCEDURE — 1101F PT FALLS ASSESS-DOCD LE1/YR: CPT | Mod: CPTII,S$GLB,, | Performed by: SPECIALIST

## 2023-12-28 PROCEDURE — 3074F SYST BP LT 130 MM HG: CPT | Mod: CPTII,S$GLB,, | Performed by: SPECIALIST

## 2023-12-28 PROCEDURE — 3288F PR FALLS RISK ASSESSMENT DOCUMENTED: ICD-10-PCS | Mod: CPTII,S$GLB,, | Performed by: SPECIALIST

## 2023-12-28 PROCEDURE — 3078F PR MOST RECENT DIASTOLIC BLOOD PRESSURE < 80 MM HG: ICD-10-PCS | Mod: CPTII,S$GLB,, | Performed by: SPECIALIST

## 2023-12-28 PROCEDURE — 99215 OFFICE O/P EST HI 40 MIN: CPT | Mod: S$GLB,,, | Performed by: SPECIALIST

## 2023-12-28 PROCEDURE — 3008F BODY MASS INDEX DOCD: CPT | Mod: CPTII,S$GLB,, | Performed by: SPECIALIST

## 2023-12-28 PROCEDURE — 1101F PR PT FALLS ASSESS DOC 0-1 FALLS W/OUT INJ PAST YR: ICD-10-PCS | Mod: CPTII,S$GLB,, | Performed by: SPECIALIST

## 2023-12-28 PROCEDURE — 99999 PR PBB SHADOW E&M-EST. PATIENT-LVL IV: ICD-10-PCS | Mod: PBBFAC,,, | Performed by: SPECIALIST

## 2023-12-28 PROCEDURE — 1159F PR MEDICATION LIST DOCUMENTED IN MEDICAL RECORD: ICD-10-PCS | Mod: CPTII,S$GLB,, | Performed by: SPECIALIST

## 2023-12-28 PROCEDURE — 1159F MED LIST DOCD IN RCRD: CPT | Mod: CPTII,S$GLB,, | Performed by: SPECIALIST

## 2023-12-28 PROCEDURE — 99999 PR PBB SHADOW E&M-EST. PATIENT-LVL IV: CPT | Mod: PBBFAC,,, | Performed by: SPECIALIST

## 2023-12-28 NOTE — PROGRESS NOTES
Subjective:       Patient ID: Acacia Bryant is a 68 y.o. female.    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   In October 2023-- Sunitha Lawson and Kendy-- worked on right inner ear-- Three encephalocoeles were removed. Ended up not doing removal of left or right semicircular canals.  Will undergo physical therapy for inner ears- vertigo has improved.  Has SAD- NI. DUE TO THAT SHE HAS BEEN GETTING RECURRENT SINO PULMONARY INFECTIONS.,. She used to be on Bactrim prophylaxis-- now on Amoxicillin prophylaxis.    Has year round allergy symptoms- treated symptomatically.  Has allergic contact dermatitis to Gold, platinum, Nickel and Tixocortol pyvalate.  She has been chronically fatigued. Never smoked cigarettes or vaped. No ongoing allergens exposure..  Afraid to vaccines-- due to their potential side effects.  Never vaped or smoked cigarettes. 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  Reports multiple drug allergies.  ON QUARTERLY BOTOX INJECTIONS FOR MIGRAINE- TREATED BY THANG MATOS MD    Cleocin [clindamycin hcl], Gold salts, Hydrocodone-acetaminophen, Hydrocortisone, Morphine sulfate, Nickel, Nickel sutures [surgical stainless steel], Oxycodone-acetaminophen, Phenergan [promethazine], Prednisolone, Sulfamethoxazole-trimethoprim, Tamiflu [oseltamivir], Titanium analogues, and Tixocortol pivalate     Past Medical History:   Diagnosis Date    Allergic urticaria 06/03/2021    Allergy     Atypical mole     Benign essential HTN     BPV (benign positional vertigo)     Bunion     Cataract     Cervical radiculopathy     Chronic maxillary sinusitis     Depression     Encephalocele     Fatigue     Hypothyroidism (acquired)     Insomnia     Low back  pain     Mixed hyperlipidemia     Outlet dysfunction constipation     PMR (polymyalgia rheumatica)        Family History   Problem Relation Age of Onset    Breast cancer Mother     Lung cancer Mother     Allergies Father     Immunodeficiency Father        Environmental History: Dust Mite Controls: Dust mite controls are already in place.     Review of Systems   Constitutional: Negative.  Negative for fatigue and fever.   HENT:  Positive for congestion and postnasal drip. Negative for ear pain, nosebleeds, rhinorrhea, sinus pressure, sinus pain, sneezing and sore throat.    Eyes: Negative.  Negative for redness and itching.   Respiratory: Negative.  Negative for cough, chest tightness, shortness of breath and wheezing.    Cardiovascular: Negative.  Negative for chest pain.   Gastrointestinal: Negative.  Negative for nausea.   Endocrine: Negative.  Negative for cold intolerance.   Genitourinary: Negative.  Negative for frequency.   Musculoskeletal: Negative.  Negative for myalgias.   Skin: Negative.  Negative for rash.   Allergic/Immunologic: Negative.  Negative for environmental allergies, food allergies and immunocompromised state.   Neurological: Negative.  Negative for dizziness and headaches.   Hematological: Negative.  Negative for adenopathy.   Psychiatric/Behavioral: Negative.  Negative for sleep disturbance.      Objective:     There were no vitals taken for this visit.    Physical Exam  Vitals and nursing note reviewed. Exam conducted with a chaperone present.   Constitutional:       Appearance: Normal appearance. She is normal weight.   HENT:      Head: Normocephalic and atraumatic.      Right Ear: Tympanic membrane, ear canal and external ear normal.      Left Ear: Tympanic membrane, ear canal and external ear normal.      Nose: Congestion and rhinorrhea present.      Mouth/Throat:      Mouth: Mucous membranes are moist.      Pharynx: Oropharynx is clear.   Eyes:      Extraocular Movements:  Extraocular movements intact.      Conjunctiva/sclera: Conjunctivae normal.      Pupils: Pupils are equal, round, and reactive to light.   Cardiovascular:      Rate and Rhythm: Normal rate and regular rhythm.      Pulses: Normal pulses.      Heart sounds: Normal heart sounds.   Pulmonary:      Effort: Pulmonary effort is normal.      Breath sounds: Normal breath sounds.   Abdominal:      General: Abdomen is flat. Bowel sounds are normal.      Palpations: Abdomen is soft.   Musculoskeletal:         General: Normal range of motion.      Cervical back: Normal range of motion and neck supple.   Skin:     General: Skin is warm and dry.      Capillary Refill: Capillary refill takes less than 2 seconds.   Neurological:      General: No focal deficit present.      Mental Status: She is alert and oriented to person, place, and time. Mental status is at baseline.   Psychiatric:         Mood and Affect: Mood normal.         Behavior: Behavior normal.         Thought Content: Thought content normal.         Judgment: Judgment normal.       Assessment:      1. 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 Recurrent sinusitis    3. Bronchitis    4. Malaise and fatigue    5. Anosmia    6. Multiple drug allergies    7. Allergic contact dermatitis due to multiple agents    8      By [facility] not allergic to Sulfa drugs- by Chase Clancy MD  9      Migraine , treated with q 3 months -- BOTOX    Plan:     NOT ALLERGIC TO SULFA DRUGS. BY BASOPHIL ACTIVATION TEST  AVOID TIXOCORTOL PYVALATE- AVOID NICKEL ANT TITANIUM ANALOGUES  On Amoxicillin 500 mg bid for prophylaxis.  Recommend AREXVY vaccine- RSV--.Does not want to take  Had Shingrix- 2 doses  HAD 3 DOSES OF COVID VACCINE.-- last one unmasked Shingles after 3 weeks  Afraid to take PCV- 20- because of large local reactions.  Nebulized Duoneb and Pulmicort 0.50 mg bid for bronchitis.  Had influenza vaccine- 2023- 2024.    Due to ACD- to Tixocortol  pyvalate-- exercise caution with steroid=-- May take Decadron and topical Desoxymetasone ointment  Will follow up with Remington Larry MD-- IN one week.  Sunitha Lawson in October 2023,  worked on 3 encephalocoele right side and - him and Dr. Larry landed up not repairing dehisced right or left semicircular canals   FOLLOW UP IN 6 MONTHS                  Problems Address                                                 Amount and/or Complexity                                                                      Risk       3           [] 2 or more self-limited or minor problems                      [] Limited                                                                        [] Low                  [] 1 stable chronic illness                                                  Any combination of the two                                               OTC drugs                  []Acute, uncomplicated illness or injury                            Review of prior external notes from unique source           Minor surgery with no risk factors                                                                                                               [] 1 []2  []3+                                                                                                              Review of results from each unique test                                                                                                               [] 1 []2  [] 3+                                                                                                              Order of each unique test                                                                                                               [] 1 []2  [] 3+                                                                                                              Or                                                                                                             [] Assessment  requiring an independent historian      4            [] One or more chronic illness with exacerbation,              [] Moderate                                                                      [] Moderate                 Progression, or side effects of treatment                            -test documents or independent historians                        Prescription drug management                []  2 or more sable chronic illnesses                                    [] Independent interpretation of tests                              Minor surgery with identifiable risk                [] 1 undiagnosed new problem with uncertain prognosis    [] Discussion or management of test results                    elective major surgery                [] 1 acute illness with                systemic symptoms                                                                                                                                                              [] 1 acute complicated injury                                                                                                                                          Elective major surgery                                                                                                                                                                                                                                                                                                                                                                                                  5            [x] 1 or more chronic illnesses with severe exacerbation,     [x] Extensive(two from below)                                         [x] High                                                                                                               [] Independent interpretation of results                         Drug therapy requiring intensive                                                                                                                []Discussion of management or test interpretation           monitoring                                                                                                                                                                                                       Decision to de-escalate care                 [] 1 acute or chronic illness or injury that poses a threat                                                                                               Decision regarding hospitalization

## 2024-03-16 DIAGNOSIS — D80.6 SPECIFIC ANTIBODY DEFICIENCY WITH NORMAL IMMUNOGLOBULIN CONCENTRATION AND NORMAL NUMBER OF B LYMPHOCYTES: ICD-10-CM

## 2024-03-16 DIAGNOSIS — J32.9 RECURRENT SINUSITIS: ICD-10-CM

## 2024-03-18 RX ORDER — AMOXICILLIN 500 MG/1
CAPSULE ORAL
Qty: 180 CAPSULE | Refills: 3 | Status: SHIPPED | OUTPATIENT
Start: 2024-03-18 | End: 2024-05-30

## 2024-04-22 ENCOUNTER — TELEPHONE (OUTPATIENT)
Dept: NEUROLOGY | Facility: CLINIC | Age: 69
End: 2024-04-22
Payer: MEDICARE

## 2024-04-22 NOTE — TELEPHONE ENCOUNTER
----- Message from Wade Crook sent at 4/22/2024 10:29 AM CDT -----  Type:  Sooner Apoointment Request    Caller is requesting a sooner appointment.    Name of Caller:pt   When is the first available appointment?07/19  Symptoms:  Xerostomia [K11.7]  Chronic maxillary sinusitis [J32.0]      Would the patient rather a call back or a response via [facility]? Call   Best Call Back Number: [phone]  Additional Information:

## 2024-04-22 NOTE — TELEPHONE ENCOUNTER
I called the patient in regards to her message. I informed her that the answering service sent the message to neurology, which doesn't treat her symptoms. She stated that the message was supposed to go to rheumatology. I told her I would forward the message over to them and apologized for the confusion. Patient verbalized understanding.

## 2024-06-12 ENCOUNTER — TELEPHONE (OUTPATIENT)
Dept: ALLERGY | Facility: CLINIC | Age: 69
End: 2024-06-12
Payer: MEDICARE

## 2024-06-12 ENCOUNTER — PATIENT MESSAGE (OUTPATIENT)
Dept: ALLERGY | Facility: CLINIC | Age: 69
End: 2024-06-12
Payer: MEDICARE

## 2024-06-12 NOTE — TELEPHONE ENCOUNTER
----- Message from Elsa Rodriguez sent at 6/12/2024  9:11 AM CDT -----  Contact: Aaccia Roger is needing a call back regarding new health issue that has come up and she is in need of a specialist for hematology oncology. Please call back at [phone].    Thank you summer

## 2024-06-12 NOTE — TELEPHONE ENCOUNTER
Spoke w/ pt, she sated wanting advise from dr soto on who to see in hematology oncology as she has had labs showing high hematocrit and high red blood cell counts

## 2024-06-26 NOTE — PROGRESS NOTES
Subjective:       Patient ID: Acacia Bryant is a 68 y.o. female.    Chief Complaint:  FOLLOW UP ON Specific Antibody Deficiency with normal immunoglobulins, recurrent infectoions, migraine - been on Botox    HPI:   female , 68 - year- old- - long time patient of ours- for follow up.  For SAD- NI- Specific Antibody DEFICIENCY WITH NORMAL IMMUNOGLOBULINS-- and recurrent infections, Acacia is on Amocicilin prophylaxis.  All breakthrough infections ae promptly treated.  Followin an infection about 15 years ago, she developed anosmia. She had consulted multiple ENTs and neurologist..    She has vertigo / inner ears pathology- had surgery in October 2023-- Will do post Op with Remington Larry MD.  SHE HAD BEEN ON BOTOX FOR INTRACTABLE MIGRAINE.  ACACIA HAS MULTIPLE DRUG ALLERGIES AND act TO MULTIPLE CONTACT ALLERGENS.  SHE HAS ACD  TO THIXOCORTOL PYVALATE.  HAS ELEVATED D- Dimer, normal CT thorax , DVT - left leg- and recent positive MAGALY, She          Cleocin [clindamycin hcl], Gold salts, Hydrocodone-acetaminophen, Hydrocortisone, Morphine sulfate, Nickel, Nickel sutures [surgical stainless steel], Oxycodone-acetaminophen, Peanut (legumes), Phenergan [promethazine], Prednisolone, Roxicodone [oxycodone], Sulfamethoxazole-trimethoprim, Tamiflu [oseltamivir], Titanium analogues, and Tixocortol pivalate -- ALLERGIES WERE REPOIRTED    Past Medical History:   Diagnosis Date    Allergic urticaria 06/03/2021    Allergy     Atypical mole     Benign essential HTN     BPV (benign positional vertigo)     Bunion     Cataract     Cervical radiculopathy     Chronic maxillary sinusitis     Depression     Encephalocele     Fatigue     Hypothyroidism (acquired)     Insomnia     Low back pain     Mixed hyperlipidemia     Outlet dysfunction constipation     PMR (polymyalgia rheumatica)        Family History   Problem Relation Name Age of Onset    Breast cancer Mother Jerri Au     Lung cancer Mother Jerri Au     Allergies Father       Immunodeficiency Father         Environmental History: Dust Mite Controls: Dust mite controls are already in place.     Review of Systems   Constitutional:  Positive for fatigue. Negative for fever.   HENT:  Positive for congestion, postnasal drip and rhinorrhea. Negative for ear pain, sinus pressure, sinus pain, sneezing and sore throat.    Eyes:  Positive for itching. Negative for redness.   Respiratory:  Positive for cough. Negative for chest tightness, shortness of breath and wheezing.    Cardiovascular: Negative.  Negative for chest pain.   Gastrointestinal: Negative.  Negative for nausea.   Endocrine: Negative.  Negative for cold intolerance.   Genitourinary: Negative.  Negative for frequency.   Musculoskeletal: Negative.  Negative for myalgias.   Skin: Negative.  Negative for rash.   Allergic/Immunologic: Negative.  Negative for environmental allergies, food allergies and immunocompromised state.   Neurological: Negative.  Negative for dizziness and headaches.   Hematological: Negative.  Negative for adenopathy.   Psychiatric/Behavioral: Negative.  Negative for sleep disturbance.      Objective:     There were no vitals taken for this visit.    Physical Exam  Vitals and nursing note reviewed.   Constitutional:       Appearance: Normal appearance. She is normal weight.   HENT:      Head: Normocephalic and atraumatic.      Right Ear: Tympanic membrane, ear canal and external ear normal.      Left Ear: Tympanic membrane, ear canal and external ear normal.      Nose: Congestion and rhinorrhea present.      Mouth/Throat:      Mouth: Mucous membranes are moist.      Pharynx: Oropharynx is clear.   Eyes:      Extraocular Movements: Extraocular movements intact.      Conjunctiva/sclera: Conjunctivae normal.      Pupils: Pupils are equal, round, and reactive to light.   Cardiovascular:      Rate and Rhythm: Normal rate and regular rhythm.      Pulses: Normal pulses.      Heart sounds: Normal heart sounds.    Pulmonary:      Effort: Pulmonary effort is normal.      Breath sounds: Normal breath sounds.   Abdominal:      General: Abdomen is flat. Bowel sounds are normal.      Palpations: Abdomen is soft.   Musculoskeletal:         General: Normal range of motion.      Cervical back: Normal range of motion and neck supple.   Skin:     General: Skin is warm and dry.      Capillary Refill: Capillary refill takes less than 2 seconds.   Neurological:      General: No focal deficit present.      Mental Status: She is alert and oriented to person, place, and time. Mental status is at baseline.   Psychiatric:         Mood and Affect: Mood normal.         Behavior: Behavior normal.         Thought Content: Thought content normal.         Judgment: Judgment normal.       Assessment:      1. 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 Recurrent sinusitis    3. Bronchitis    4. Migraine without status migrainosus, not intractable, unspecified migraine type    5. Malaise and fatigue    6. Anosmia    7. Multiple drug allergies    8. Allergic contact dermatitis due to metals    9       Not allergic to sulfa- per [facility]  10    DVT- LEFT LEG AND ELEVATED d- dimer. WAS STARTED ON ELIQUIS- CT  THORAX- NORMAL          -- rules out PE. Has an appointment to consult Christian Bush MD in August 2024    Plan:       On Amoxicillin prophylaxis- 500 mg bid.  As needed Duoneb one unit plus budesonide 0.50 mg bid,.  ----------------------------------------------------------------------------------------------  Treat all infections.  Desoximetasone  and Decadron are OK- since she had ACD to Tixocortol pyvalate.  ------------------------------------------------------------------------------------------------------------  Recommend AREXVY - RSV vaccine.  Afraid to take Prevnar- 20-- due to having large local reaction to PPSV- 23 in the past.  Annual influenza vaccinations.  Reviewed the RECENT laboratory  [facility]   has elevated d- dimer and positive MAGALY - NEED TO DO complete MAGALY profile. Has new patient appointments with rheumatologist mimi Bush MD, HEMATOLOGIST.  On Eliquis  --------------------------------------------------------------------------------------------------  Follow up with Remington Larry MD AND Dr. MARIA- POST SURGICAL ( OCTOBER 2023 ) REPAIRED DEHISCED BILATERAL SEMICIRCULAR CANALS AND 3 RIGHT SIDED ENCEPHALOCOELE SURGERY.  Follow up in 6 months                    Problems Address                                                 Amount and/or Complexity                                                                      Risk       3           [] 2 or more self-limited or minor problems                      [] Limited                                                                        [] Low                  [] 1 stable chronic illness                                                  Any combination of the two                                               OTC drugs                  []Acute, uncomplicated illness or injury                            Review of prior external notes from unique source           Minor surgery with no risk factors                                                                                                               [] 1 []2  []3+                                                                                                              Review of results from each unique test                                                                                                               [] 1 []2  [] 3+                                                                                                              Order of each unique test                                                                                                               [] 1 []2  [] 3+                                                                                                               Or                                                                                                             [] Assessment requiring an independent historian      4            [] One or more chronic illness with exacerbation,              [] Moderate                                                                      [] Moderate                 Progression, or side effects of treatment                            -test documents or independent historians                        Prescription drug management                []  2 or more sable chronic illnesses                                    [] Independent interpretation of tests                              Minor surgery with identifiable risk                [] 1 undiagnosed new problem with uncertain prognosis    [] Discussion or management of test results                    elective major surgery                [] 1 acute illness with                systemic symptoms                                                                                                                                                              [] 1 acute complicated injury                                                                                                                                          Elective major surgery                                                                                                                                                                                                                                                                                                                                                                                                  5            [] 1 or more chronic illnesses with severe exacerbation,     [] Extensive(two from below)                                         [] High                                                                                                                [] Independent interpretation of results                         Drug therapy requiring intensive                                                                                                               []Discussion of management or test interpretation           monitoring                                                                                                                                                                                                       Decision to de-escalate care                 [] 1 acute or chronic illness or injury that poses a threat                                                                                               Decision regarding hospitalization

## 2024-06-27 ENCOUNTER — OFFICE VISIT (OUTPATIENT)
Dept: ALLERGY | Facility: CLINIC | Age: 69
End: 2024-06-27
Payer: MEDICARE

## 2024-06-27 VITALS
TEMPERATURE: 98 F | WEIGHT: 179.25 LBS | DIASTOLIC BLOOD PRESSURE: 68 MMHG | HEART RATE: 81 BPM | BODY MASS INDEX: 29.83 KG/M2 | SYSTOLIC BLOOD PRESSURE: 116 MMHG

## 2024-06-27 DIAGNOSIS — J32.9 RECURRENT SINUSITIS: ICD-10-CM

## 2024-06-27 DIAGNOSIS — R53.83 MALAISE AND FATIGUE: ICD-10-CM

## 2024-06-27 DIAGNOSIS — G43.909 MIGRAINE WITHOUT STATUS MIGRAINOSUS, NOT INTRACTABLE, UNSPECIFIED MIGRAINE TYPE: ICD-10-CM

## 2024-06-27 DIAGNOSIS — D80.6 SPECIFIC ANTIBODY DEFICIENCY WITH NORMAL IG CONCENTRATION AND NORMAL NUMBER OF B CELLS: Primary | ICD-10-CM

## 2024-06-27 DIAGNOSIS — J40 BRONCHITIS: ICD-10-CM

## 2024-06-27 DIAGNOSIS — R43.0 ANOSMIA: ICD-10-CM

## 2024-06-27 DIAGNOSIS — L23.0 ALLERGIC CONTACT DERMATITIS DUE TO METALS: ICD-10-CM

## 2024-06-27 DIAGNOSIS — Z88.9 MULTIPLE DRUG ALLERGIES: ICD-10-CM

## 2024-06-27 DIAGNOSIS — R53.81 MALAISE AND FATIGUE: ICD-10-CM

## 2024-06-27 PROCEDURE — 3044F HG A1C LEVEL LT 7.0%: CPT | Mod: CPTII,S$GLB,, | Performed by: SPECIALIST

## 2024-06-27 PROCEDURE — 3008F BODY MASS INDEX DOCD: CPT | Mod: CPTII,S$GLB,, | Performed by: SPECIALIST

## 2024-06-27 PROCEDURE — 3288F FALL RISK ASSESSMENT DOCD: CPT | Mod: CPTII,S$GLB,, | Performed by: SPECIALIST

## 2024-06-27 PROCEDURE — 1126F AMNT PAIN NOTED NONE PRSNT: CPT | Mod: CPTII,S$GLB,, | Performed by: SPECIALIST

## 2024-06-27 PROCEDURE — 1160F RVW MEDS BY RX/DR IN RCRD: CPT | Mod: CPTII,S$GLB,, | Performed by: SPECIALIST

## 2024-06-27 PROCEDURE — 3074F SYST BP LT 130 MM HG: CPT | Mod: CPTII,S$GLB,, | Performed by: SPECIALIST

## 2024-06-27 PROCEDURE — 1101F PT FALLS ASSESS-DOCD LE1/YR: CPT | Mod: CPTII,S$GLB,, | Performed by: SPECIALIST

## 2024-06-27 PROCEDURE — 99215 OFFICE O/P EST HI 40 MIN: CPT | Mod: S$GLB,,, | Performed by: SPECIALIST

## 2024-06-27 PROCEDURE — 3078F DIAST BP <80 MM HG: CPT | Mod: CPTII,S$GLB,, | Performed by: SPECIALIST

## 2024-06-27 PROCEDURE — 99999 PR PBB SHADOW E&M-EST. PATIENT-LVL IV: CPT | Mod: PBBFAC,,, | Performed by: SPECIALIST

## 2024-06-27 PROCEDURE — 1159F MED LIST DOCD IN RCRD: CPT | Mod: CPTII,S$GLB,, | Performed by: SPECIALIST

## 2024-07-01 ENCOUNTER — OFFICE VISIT (OUTPATIENT)
Dept: RHEUMATOLOGY | Facility: CLINIC | Age: 69
End: 2024-07-01
Payer: MEDICARE

## 2024-07-01 ENCOUNTER — HOSPITAL ENCOUNTER (OUTPATIENT)
Dept: RADIOLOGY | Facility: HOSPITAL | Age: 69
Discharge: HOME | End: 2024-07-01
Attending: STUDENT IN AN ORGANIZED HEALTH CARE EDUCATION/TRAINING PROGRAM
Payer: MEDICARE

## 2024-07-01 VITALS
HEART RATE: 79 BPM | DIASTOLIC BLOOD PRESSURE: 78 MMHG | BODY MASS INDEX: 29.68 KG/M2 | HEIGHT: 65 IN | WEIGHT: 178.13 LBS | SYSTOLIC BLOOD PRESSURE: 128 MMHG

## 2024-07-01 DIAGNOSIS — M25.541 ARTHRALGIA OF BOTH HANDS: ICD-10-CM

## 2024-07-01 DIAGNOSIS — R76.8 POSITIVE ANA (ANTINUCLEAR ANTIBODY): Primary | ICD-10-CM

## 2024-07-01 DIAGNOSIS — M25.542 ARTHRALGIA OF BOTH HANDS: ICD-10-CM

## 2024-07-01 PROCEDURE — 3288F FALL RISK ASSESSMENT DOCD: CPT | Mod: CPTII,S$GLB,, | Performed by: STUDENT IN AN ORGANIZED HEALTH CARE EDUCATION/TRAINING PROGRAM

## 2024-07-01 PROCEDURE — 1125F AMNT PAIN NOTED PAIN PRSNT: CPT | Mod: CPTII,S$GLB,, | Performed by: STUDENT IN AN ORGANIZED HEALTH CARE EDUCATION/TRAINING PROGRAM

## 2024-07-01 PROCEDURE — 73130 X-RAY EXAM OF HAND: CPT | Mod: TC,50

## 2024-07-01 PROCEDURE — 3078F DIAST BP <80 MM HG: CPT | Mod: CPTII,S$GLB,, | Performed by: STUDENT IN AN ORGANIZED HEALTH CARE EDUCATION/TRAINING PROGRAM

## 2024-07-01 PROCEDURE — 1160F RVW MEDS BY RX/DR IN RCRD: CPT | Mod: CPTII,S$GLB,, | Performed by: STUDENT IN AN ORGANIZED HEALTH CARE EDUCATION/TRAINING PROGRAM

## 2024-07-01 PROCEDURE — 3044F HG A1C LEVEL LT 7.0%: CPT | Mod: CPTII,S$GLB,, | Performed by: STUDENT IN AN ORGANIZED HEALTH CARE EDUCATION/TRAINING PROGRAM

## 2024-07-01 PROCEDURE — 99999 PR PBB SHADOW E&M-EST. PATIENT-LVL V: CPT | Mod: PBBFAC,,, | Performed by: STUDENT IN AN ORGANIZED HEALTH CARE EDUCATION/TRAINING PROGRAM

## 2024-07-01 PROCEDURE — 3008F BODY MASS INDEX DOCD: CPT | Mod: CPTII,S$GLB,, | Performed by: STUDENT IN AN ORGANIZED HEALTH CARE EDUCATION/TRAINING PROGRAM

## 2024-07-01 PROCEDURE — 1101F PT FALLS ASSESS-DOCD LE1/YR: CPT | Mod: CPTII,S$GLB,, | Performed by: STUDENT IN AN ORGANIZED HEALTH CARE EDUCATION/TRAINING PROGRAM

## 2024-07-01 PROCEDURE — 3074F SYST BP LT 130 MM HG: CPT | Mod: CPTII,S$GLB,, | Performed by: STUDENT IN AN ORGANIZED HEALTH CARE EDUCATION/TRAINING PROGRAM

## 2024-07-01 PROCEDURE — 73130 X-RAY EXAM OF HAND: CPT | Mod: 26,50,, | Performed by: RADIOLOGY

## 2024-07-01 PROCEDURE — 99205 OFFICE O/P NEW HI 60 MIN: CPT | Mod: S$GLB,,, | Performed by: STUDENT IN AN ORGANIZED HEALTH CARE EDUCATION/TRAINING PROGRAM

## 2024-07-01 PROCEDURE — 1159F MED LIST DOCD IN RCRD: CPT | Mod: CPTII,S$GLB,, | Performed by: STUDENT IN AN ORGANIZED HEALTH CARE EDUCATION/TRAINING PROGRAM

## 2024-07-01 RX ORDER — DICLOFENAC SODIUM 10 MG/G
2 GEL TOPICAL 4 TIMES DAILY
Qty: 100 G | Refills: 3 | Status: SHIPPED | OUTPATIENT
Start: 2024-07-01

## 2024-07-01 NOTE — PATIENT INSTRUCTIONS
Try taking Tylenol up to 3000mg daily (from all sources)    Try taking Turmeric (make sure contains black pepper extract)    Try using Voltaren (diclofenac) gel up to four times daily on painful joints

## 2024-09-18 ENCOUNTER — PATIENT MESSAGE (OUTPATIENT)
Dept: ALLERGY | Facility: CLINIC | Age: 69
End: 2024-09-18
Payer: MEDICARE

## 2024-10-03 ENCOUNTER — PATIENT MESSAGE (OUTPATIENT)
Dept: ALLERGY | Facility: CLINIC | Age: 69
End: 2024-10-03
Payer: MEDICARE

## 2024-10-30 ENCOUNTER — PATIENT MESSAGE (OUTPATIENT)
Dept: ALLERGY | Facility: CLINIC | Age: 69
End: 2024-10-30
Payer: MEDICARE

## 2024-11-07 PROBLEM — H93.13 TINNITUS OF BOTH EARS: Status: ACTIVE | Noted: 2024-07-03

## 2024-11-07 PROBLEM — H90.42 SENSORINEURAL HEARING LOSS (SNHL) OF LEFT EAR WITH UNRESTRICTED HEARING OF RIGHT EAR: Status: ACTIVE | Noted: 2023-05-10

## 2024-11-07 PROBLEM — M85.89 OSTEOPENIA OF MULTIPLE SITES: Chronic | Status: ACTIVE | Noted: 2024-10-30

## 2024-12-19 ENCOUNTER — TELEPHONE (OUTPATIENT)
Dept: ALLERGY | Facility: CLINIC | Age: 69
End: 2024-12-19
Payer: MEDICARE

## 2024-12-19 NOTE — TELEPHONE ENCOUNTER
----- Message from Deisy sent at 12/19/2024 10:31 AM CST -----  Contact: Acacia  Type: Appointment Request    Caller is requesting an appointment.    Name of Caller:Acacia   Symptoms: follow up   Would the patient rather a call back or a response via [facility]? call  Best Call Back Number:[phone]    Additional Information:

## 2025-02-11 PROBLEM — D68.61 ANTIPHOSPHOLIPID ANTIBODY SYNDROME: Status: ACTIVE | Noted: 2025-02-05

## 2025-02-11 PROBLEM — Z79.01 CHRONIC ANTICOAGULATION: Status: ACTIVE | Noted: 2025-02-05

## 2025-02-11 PROBLEM — G44.049 CHRONIC PAROXYSMAL HEMICRANIA, NOT INTRACTABLE: Status: ACTIVE | Noted: 2025-02-05

## 2025-02-15 ENCOUNTER — OFFICE VISIT (OUTPATIENT)
Dept: ALLERGY | Facility: CLINIC | Age: 70
End: 2025-02-15
Payer: MEDICARE

## 2025-02-15 VITALS
TEMPERATURE: 99 F | BODY MASS INDEX: 30.74 KG/M2 | DIASTOLIC BLOOD PRESSURE: 71 MMHG | WEIGHT: 184.75 LBS | SYSTOLIC BLOOD PRESSURE: 129 MMHG | HEART RATE: 88 BPM

## 2025-02-15 DIAGNOSIS — R53.81 MALAISE AND FATIGUE: ICD-10-CM

## 2025-02-15 DIAGNOSIS — R43.0 ANOSMIA: ICD-10-CM

## 2025-02-15 DIAGNOSIS — R53.83 MALAISE AND FATIGUE: ICD-10-CM

## 2025-02-15 DIAGNOSIS — D80.6 SPECIFIC ANTIBODY DEFICIENCY WITH NORMAL IG CONCENTRATION AND NORMAL NUMBER OF B CELLS: Primary | ICD-10-CM

## 2025-02-15 DIAGNOSIS — J32.9 RECURRENT SINUSITIS: ICD-10-CM

## 2025-02-15 DIAGNOSIS — L23.0 ALLERGIC CONTACT DERMATITIS DUE TO METALS: ICD-10-CM

## 2025-02-15 DIAGNOSIS — G43.909 MIGRAINE WITHOUT STATUS MIGRAINOSUS, NOT INTRACTABLE, UNSPECIFIED MIGRAINE TYPE: ICD-10-CM

## 2025-02-15 DIAGNOSIS — Z88.9 MULTIPLE DRUG ALLERGIES: ICD-10-CM

## 2025-02-15 DIAGNOSIS — J40 BRONCHITIS: ICD-10-CM

## 2025-02-15 PROCEDURE — 99999 PR PBB SHADOW E&M-EST. PATIENT-LVL IV: CPT | Mod: PBBFAC,,, | Performed by: SPECIALIST

## 2025-02-15 NOTE — PROGRESS NOTES
Subjective:       Patient ID: Acacia Bryant is a 69 y.o. female.    Chief Complaint:      FOLLOW UP ON SPECIFIC ANTIBODY DEFICIENCY- WITH NORMAL IMMUNOGLOBULINS  AND RECURRENT SINO- PULMONARY INFECTIONS, History of DVT on Eliquis, Migraine, anosmia, ACD to Tixocortol Pyvalate, multiple drug allergies and malaise and fatigue        HPI:   female 69- year- old with the above complaints- for follow up visit.  From my perspective, she has Specific antibody- deficiency, with normal immunoglobulins. For infection prevention, she has been on antibiotic prophylaxis, over the years. She is currently on Augmenti daily . She used to be on TMP- SMZ. Bereatkthrough infections promptly treated. For over a decade she has been anosmic- following an infection.  Allergy evaluation, about 2 decades ago, revealed PERENNIAL NON ALLERGIC RHINITIS- TREATED SYMPTOMATICALLY.  She is well versed with environmental control measures.    Patch Tesing with T.R.U.E Test revealed allergic Contact Dermatitis to Tixocortol Pyvalate and she may avoid certain steroid  Groups. She can have decadron and Desoximetasone.  Geraldo has multiple drug allergies. Today her BMI is 30.74.    Acacia was diagnosed by Haem / oncology with Antiphospholipid syndrome ( Dr Christian Bush relocated to [facility], [facility] [place] ). Now has a NP and Dr Quinonez- mamadou oncologist. Her neurologist is DR Jeremy Mckenna and interventional VASCULAR VEIN SURGEON is Dr Tierney and his NP. 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 She had earlier consulted neuro surgeon Sharlene.   For DVT- CURRENTLY IS ON WARFARIN..  She will follow up with her various providers.  Acacia recently had Treadmill ECHO, Cardiac ultrasound and CT angiogram by her cardiologist                               Cleocin [clindamycin hcl], Gold salts, Hydrocodone-acetaminophen, Hydrocortisone, Morphine  sulfate, Nickel, Nickel sutures [surgical stainless steel], Oxycodone-acetaminophen, Phenergan [promethazine], Roxicodone [oxycodone], Sulfamethoxazole-trimethoprim, Tamiflu [oseltamivir], Titanium analogues, and Tixocortol pivalate -- allergies were reported.    Past Medical History:   Diagnosis Date    Allergic urticaria 06/03/2021    Allergy     Antiphospholipid antibody syndrome 11/2024    Atypical mole     Benign essential HTN     BPV (benign positional vertigo)     Bunion     Cataract     Cervical radiculopathy     Chronic maxillary sinusitis     Depression     Encephalocele     Fatigue     Hypothyroidism (acquired)     Insomnia     Low back pain     Mixed hyperlipidemia     Outlet dysfunction constipation     PMR (polymyalgia rheumatica)        Family History   Problem Relation Name Age of Onset    Breast cancer Mother Jerri Au     Lung cancer Mother Jerri Au     Arthritis Mother Jerri Au     Cancer Mother Jerri Au     Allergies Father Durga Au     Immunodeficiency Father Durga Au     Arthritis Father Durga Au     Cancer Father Durga Au     Breast cancer Maternal Aunt         Environmental History: Dust Mite Controls: Dust mite controls are already in place.     Review of Systems   Constitutional:  Positive for fatigue.   HENT:  Positive for congestion and postnasal drip.    Eyes: Negative.    Respiratory: Negative.  Negative for cough.    Cardiovascular: Negative.    Gastrointestinal: Negative.    Endocrine: Negative.    Genitourinary: Negative.    Musculoskeletal: Negative.    Skin: Negative.    Allergic/Immunologic: Negative.    Neurological: Negative.    Hematological: Negative.    Psychiatric/Behavioral: Negative.       Objective:     There were no vitals taken for this visit.    Physical Exam  Vitals and nursing note reviewed.   Constitutional:       Appearance: Normal appearance. She is obese.   HENT:      Head: Normocephalic and atraumatic.       Right Ear: Tympanic membrane, ear canal and external ear normal.      Left Ear: Tympanic membrane, ear canal and external ear normal.      Nose: Congestion and rhinorrhea present.      Mouth/Throat:      Mouth: Mucous membranes are moist.      Pharynx: Oropharynx is clear.   Eyes:      Extraocular Movements: Extraocular movements intact.      Conjunctiva/sclera: Conjunctivae normal.      Pupils: Pupils are equal, round, and reactive to light.   Cardiovascular:      Rate and Rhythm: Normal rate and regular rhythm.      Pulses: Normal pulses.      Heart sounds: Normal heart sounds.   Pulmonary:      Effort: Pulmonary effort is normal.      Breath sounds: Normal breath sounds.   Abdominal:      General: Abdomen is flat. Bowel sounds are normal.      Palpations: Abdomen is soft.   Musculoskeletal:         General: Normal range of motion.      Cervical back: Normal range of motion and neck supple.   Skin:     General: Skin is warm and dry.      Capillary Refill: Capillary refill takes less than 2 seconds.   Neurological:      General: No focal deficit present.      Mental Status: She is alert and oriented to person, place, and time. Mental status is at baseline.   Psychiatric:         Mood and Affect: Mood normal.         Behavior: Behavior normal.         Thought Content: Thought content normal.         Judgment: Judgment normal.       Assessment:      1. 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 Recurrent sinusitis    3. Bronchitis    4. Malaise and fatigue    5. Migraine without status migrainosus, not intractable, unspecified migraine type    6. Anosmia    7. Multiple drug allergies    8. Allergic contact dermatitis due to metals    9       DVT left leg- on Eliquis  10     ACD to Tixocortol pyvalate.  11      Had normal Basophil activation Test to sulfamethoxazole.  12      Antiphospholipid syndrome      Plan:     Amoxicillin 500 mg bid  PROPHYLAXIS.  ---------------------------------------------------------  As needed Duoneb one unit plus Budesonide 0.50 mg bid.  ------------------------------------------------------------------------------------  Treat all infections.  May take Decadron  and Desoximetasone   -------------------------------------------------------------------  Used to be on Eliquis- for/ following  DVT -- CURRENTLY ON WARFARIN.  Afraid to take PREVNAR- 20- HAD LARGE LOCAL REACTION AFTER PPSV 23 IN THE PAST  HAD  AREXVY--- Had Shingrix  ANNUAL INFLUENZA VACCINATION.  HAD ELEVATED d- dimer and positive MAGALY profile.  Follow up with oncologist and rheumatologist and PCP Raffaele Robert MD / Socorro Marino MD.  ---------------------------------------------------------------------------------------------------------------  Follow up with Remington Laryr MD / Dr Wong- In October 2023 repaired dehisced bilateral semi circular canal and three right sided  encephalocele surgery.  Follow up in 6 months.                  Problems Address                                                 Amount and/or Complexity                                                                      Risk       3           [] 2 or more self-limited or minor problems                      [] Limited                                                                        [] Low                  [] 1 stable chronic illness                                                  Any combination of the two                                               OTC drugs                  []Acute, uncomplicated illness or injury                            Review of prior external notes from unique source           Minor surgery with no risk factors                                                                                                               [] 1 []2  []3+                                                                                                              Review of results  from each unique test                                                                                                               [] 1 []2  [] 3+                                                                                                              Order of each unique test                                                                                                               [] 1 []2  [] 3+                                                                                                              Or                                                                                                             [] Assessment requiring an independent historian      4            [] One or more chronic illness with exacerbation,              [] Moderate                                                                      [] Moderate                 Progression, or side effects of treatment                            -test documents or independent historians                        Prescription drug management                []  2 or more sable chronic illnesses                                    [] Independent interpretation of tests                              Minor surgery with identifiable risk                [] 1 undiagnosed new problem with uncertain prognosis    [] Discussion or management of test results                    elective major surgery                [] 1 acute illness with                systemic symptoms                                                                                                                                                              [] 1 acute complicated injury                                                                                                                                          Elective major surgery                                                                                                                                                                                                                                                                                                                                                                                                   5            [x] 1 or more chronic illnesses with severe exacerbation,     [x] Extensive(two from below)                                         [x] High                                                                                                               [] Independent interpretation of results                         Drug therapy requiring intensive                                                                                                               []Discussion of management or test interpretation           monitoring                                                                                                                                                                                                       Decision to de-escalate care                 [] 1 acute or chronic illness or injury that poses a threat                                                                                               Decision regarding hospitalization

## 2025-03-13 RX ORDER — AMOXICILLIN 500 MG/1
CAPSULE ORAL
Qty: 180 CAPSULE | Refills: 3 | Status: SHIPPED | OUTPATIENT
Start: 2025-03-13

## 2025-08-21 ENCOUNTER — OFFICE VISIT (OUTPATIENT)
Dept: ALLERGY | Facility: CLINIC | Age: 70
End: 2025-08-21
Payer: MEDICARE

## 2025-08-21 VITALS
HEART RATE: 84 BPM | BODY MASS INDEX: 31.55 KG/M2 | DIASTOLIC BLOOD PRESSURE: 76 MMHG | TEMPERATURE: 98 F | SYSTOLIC BLOOD PRESSURE: 128 MMHG | WEIGHT: 189.63 LBS

## 2025-08-21 DIAGNOSIS — G43.009 MIGRAINE WITHOUT AURA AND WITHOUT STATUS MIGRAINOSUS, NOT INTRACTABLE: ICD-10-CM

## 2025-08-21 DIAGNOSIS — J32.9 RECURRENT SINUSITIS: ICD-10-CM

## 2025-08-21 DIAGNOSIS — R43.0 ANOSMIA: ICD-10-CM

## 2025-08-21 DIAGNOSIS — D80.6 SPECIFIC ANTIBODY DEFICIENCY WITH NORMAL IG CONCENTRATION AND NORMAL NUMBER OF B CELLS: Primary | ICD-10-CM

## 2025-08-21 DIAGNOSIS — L23.0 ALLERGIC CONTACT DERMATITIS DUE TO METALS: ICD-10-CM

## 2025-08-21 DIAGNOSIS — R53.82 CHRONIC FATIGUE AND MALAISE: ICD-10-CM

## 2025-08-21 DIAGNOSIS — J40 BRONCHITIS: ICD-10-CM

## 2025-08-21 DIAGNOSIS — R53.81 CHRONIC FATIGUE AND MALAISE: ICD-10-CM

## 2025-08-21 DIAGNOSIS — D68.61 ANTIPHOSPHOLIPID SYNDROME: ICD-10-CM

## 2025-08-21 PROCEDURE — 3078F DIAST BP <80 MM HG: CPT | Mod: CPTII,S$GLB,, | Performed by: SPECIALIST

## 2025-08-21 PROCEDURE — 1159F MED LIST DOCD IN RCRD: CPT | Mod: CPTII,S$GLB,, | Performed by: SPECIALIST

## 2025-08-21 PROCEDURE — 99999 PR PBB SHADOW E&M-EST. PATIENT-LVL IV: CPT | Mod: PBBFAC,,, | Performed by: SPECIALIST

## 2025-08-21 PROCEDURE — 1126F AMNT PAIN NOTED NONE PRSNT: CPT | Mod: CPTII,S$GLB,, | Performed by: SPECIALIST

## 2025-08-21 PROCEDURE — 3288F FALL RISK ASSESSMENT DOCD: CPT | Mod: CPTII,S$GLB,, | Performed by: SPECIALIST

## 2025-08-21 PROCEDURE — 1101F PT FALLS ASSESS-DOCD LE1/YR: CPT | Mod: CPTII,S$GLB,, | Performed by: SPECIALIST

## 2025-08-21 PROCEDURE — 1160F RVW MEDS BY RX/DR IN RCRD: CPT | Mod: CPTII,S$GLB,, | Performed by: SPECIALIST

## 2025-08-21 PROCEDURE — 99215 OFFICE O/P EST HI 40 MIN: CPT | Mod: S$GLB,,, | Performed by: SPECIALIST

## 2025-08-21 PROCEDURE — 3074F SYST BP LT 130 MM HG: CPT | Mod: CPTII,S$GLB,, | Performed by: SPECIALIST

## 2025-08-21 PROCEDURE — 3008F BODY MASS INDEX DOCD: CPT | Mod: CPTII,S$GLB,, | Performed by: SPECIALIST

## 2025-08-21 PROCEDURE — 3044F HG A1C LEVEL LT 7.0%: CPT | Mod: CPTII,S$GLB,, | Performed by: SPECIALIST

## 2025-08-21 RX ORDER — CLONAZEPAM 0.5 MG/1
0.5 TABLET ORAL 2 TIMES DAILY PRN
COMMUNITY

## 2025-08-21 RX ORDER — LAMOTRIGINE 25 MG/1
25 TABLET ORAL DAILY
COMMUNITY